# Patient Record
Sex: FEMALE | Race: WHITE | NOT HISPANIC OR LATINO | Employment: UNEMPLOYED | URBAN - NONMETROPOLITAN AREA
[De-identification: names, ages, dates, MRNs, and addresses within clinical notes are randomized per-mention and may not be internally consistent; named-entity substitution may affect disease eponyms.]

---

## 2023-03-17 PROBLEM — R76.8 POSITIVE ANA (ANTINUCLEAR ANTIBODY): Status: ACTIVE | Noted: 2023-03-17

## 2023-03-17 PROBLEM — N92.0 HEAVY PERIODS: Status: ACTIVE | Noted: 2023-03-17

## 2023-03-17 PROBLEM — F41.9 ANXIETY: Status: ACTIVE | Noted: 2023-03-17

## 2023-03-17 PROBLEM — M21.42 BILATERAL PES PLANUS: Status: ACTIVE | Noted: 2023-03-17

## 2023-03-17 PROBLEM — M27.8 JAW MASS: Status: ACTIVE | Noted: 2023-03-17

## 2023-03-17 PROBLEM — R01.1 HEART MURMUR: Status: ACTIVE | Noted: 2023-03-17

## 2023-03-17 PROBLEM — M79.605 BILATERAL LEG AND FOOT PAIN: Status: ACTIVE | Noted: 2023-03-17

## 2023-03-17 PROBLEM — M25.50 POLYARTHRALGIA: Status: ACTIVE | Noted: 2023-03-17

## 2023-03-17 PROBLEM — R20.2 NUMBNESS AND TINGLING: Status: ACTIVE | Noted: 2023-03-17

## 2023-03-17 PROBLEM — R26.2 DIFFICULTY WALKING: Status: ACTIVE | Noted: 2023-03-17

## 2023-03-17 PROBLEM — R20.0 FACIAL NUMBNESS: Status: ACTIVE | Noted: 2023-03-17

## 2023-03-17 PROBLEM — E66.3 OVERWEIGHT WITH BODY MASS INDEX (BMI) OF 27 TO 27.9 IN ADULT: Status: ACTIVE | Noted: 2023-03-17

## 2023-03-17 PROBLEM — J02.9 SORE THROAT: Status: ACTIVE | Noted: 2023-03-17

## 2023-03-17 PROBLEM — R21 SKIN RASH: Status: ACTIVE | Noted: 2023-03-17

## 2023-03-17 PROBLEM — D51.0 PERNICIOUS ANEMIA: Status: ACTIVE | Noted: 2023-03-17

## 2023-03-17 PROBLEM — R53.82 CHRONIC FATIGUE: Status: ACTIVE | Noted: 2023-03-17

## 2023-03-17 PROBLEM — M21.41 FLAT FEET, BILATERAL: Status: ACTIVE | Noted: 2023-03-17

## 2023-03-17 PROBLEM — L23.9 ALLERGIC CONTACT DERMATITIS: Status: ACTIVE | Noted: 2023-03-17

## 2023-03-17 PROBLEM — B37.31 VAGINAL CANDIDIASIS: Status: ACTIVE | Noted: 2023-03-17

## 2023-03-17 PROBLEM — J30.9 ALLERGIC RHINITIS: Status: ACTIVE | Noted: 2023-03-17

## 2023-03-17 PROBLEM — R35.0 URINARY FREQUENCY: Status: ACTIVE | Noted: 2023-03-17

## 2023-03-17 PROBLEM — E55.9 VITAMIN D DEFICIENCY: Status: ACTIVE | Noted: 2023-03-17

## 2023-03-17 PROBLEM — R59.1 LYMPHADENOPATHY: Status: ACTIVE | Noted: 2023-03-17

## 2023-03-17 PROBLEM — M79.672 BILATERAL LEG AND FOOT PAIN: Status: ACTIVE | Noted: 2023-03-17

## 2023-03-17 PROBLEM — M21.41 BILATERAL PES PLANUS: Status: ACTIVE | Noted: 2023-03-17

## 2023-03-17 PROBLEM — R22.0 CHEEK MASS: Status: ACTIVE | Noted: 2023-03-17

## 2023-03-17 PROBLEM — R20.0 NUMBNESS AND TINGLING: Status: ACTIVE | Noted: 2023-03-17

## 2023-03-17 PROBLEM — M79.604 BILATERAL LEG AND FOOT PAIN: Status: ACTIVE | Noted: 2023-03-17

## 2023-03-17 PROBLEM — M21.42 FLAT FEET, BILATERAL: Status: ACTIVE | Noted: 2023-03-17

## 2023-03-17 PROBLEM — L98.9 CHANGING SKIN LESION: Status: ACTIVE | Noted: 2023-03-17

## 2023-03-17 PROBLEM — E78.5 DYSLIPIDEMIA: Status: ACTIVE | Noted: 2023-03-17

## 2023-03-17 PROBLEM — R11.2 NAUSEA AND VOMITING IN ADULT: Status: ACTIVE | Noted: 2023-03-17

## 2023-03-17 PROBLEM — N92.6 IRREGULAR MENSES: Status: ACTIVE | Noted: 2023-03-17

## 2023-03-17 PROBLEM — M79.671 BILATERAL LEG AND FOOT PAIN: Status: ACTIVE | Noted: 2023-03-17

## 2023-03-17 RX ORDER — ERGOCALCIFEROL 1.25 MG/1
1.25 CAPSULE ORAL
COMMUNITY
End: 2023-07-26 | Stop reason: WASHOUT

## 2023-03-17 RX ORDER — ACETAMINOPHEN, DEXTROMETHORPHAN HBR, DOXYLAMINE SUCCINATE, PHENYLEPHRINE HCL 650; 20; 12.5; 1 MG/30ML; MG/30ML; MG/30ML; MG/30ML
1000 SOLUTION ORAL DAILY
COMMUNITY
End: 2023-07-26 | Stop reason: WASHOUT

## 2023-03-20 ENCOUNTER — OFFICE VISIT (OUTPATIENT)
Dept: PRIMARY CARE | Facility: CLINIC | Age: 33
End: 2023-03-20
Payer: COMMERCIAL

## 2023-03-20 DIAGNOSIS — D51.0 PERNICIOUS ANEMIA: ICD-10-CM

## 2023-03-20 PROCEDURE — 96372 THER/PROPH/DIAG INJ SC/IM: CPT | Performed by: FAMILY MEDICINE

## 2023-03-20 PROCEDURE — 99211 OFF/OP EST MAY X REQ PHY/QHP: CPT | Performed by: FAMILY MEDICINE

## 2023-03-20 RX ORDER — CYANOCOBALAMIN 1000 UG/ML
1000 INJECTION, SOLUTION INTRAMUSCULAR; SUBCUTANEOUS ONCE
Status: COMPLETED | OUTPATIENT
Start: 2023-03-20 | End: 2023-03-20

## 2023-03-20 RX ADMIN — CYANOCOBALAMIN 1000 MCG: 1000 INJECTION, SOLUTION INTRAMUSCULAR; SUBCUTANEOUS at 11:52

## 2023-03-28 LAB — CHORIOGONADOTROPIN (MIU/ML) IN SER/PLAS: 5610 IU/L

## 2023-03-30 LAB — CHORIOGONADOTROPIN (MIU/ML) IN SER/PLAS: ABNORMAL MIU/ML

## 2023-04-26 LAB
ABO GROUP (TYPE) IN BLOOD: NORMAL
ANTIBODY SCREEN: NORMAL
ERYTHROCYTE DISTRIBUTION WIDTH (RATIO) BY AUTOMATED COUNT: 12.6 % (ref 11.5–14.5)
ERYTHROCYTE MEAN CORPUSCULAR HEMOGLOBIN CONCENTRATION (G/DL) BY AUTOMATED: 33.2 G/DL (ref 32–36)
ERYTHROCYTE MEAN CORPUSCULAR VOLUME (FL) BY AUTOMATED COUNT: 91 FL (ref 80–100)
ERYTHROCYTES (10*6/UL) IN BLOOD BY AUTOMATED COUNT: 4.49 X10E12/L (ref 4–5.2)
HEMATOCRIT (%) IN BLOOD BY AUTOMATED COUNT: 40.7 % (ref 36–46)
HEMOGLOBIN (G/DL) IN BLOOD: 13.5 G/DL (ref 12–16)
HEPATITIS B VIRUS SURFACE AG PRESENCE IN SERUM: NONREACTIVE
HEPATITIS C VIRUS AB PRESENCE IN SERUM: NONREACTIVE
HIV 1/ 2 AG/AB SCREEN: NONREACTIVE
LEUKOCYTES (10*3/UL) IN BLOOD BY AUTOMATED COUNT: 9.6 X10E9/L (ref 4.4–11.3)
PLATELETS (10*3/UL) IN BLOOD AUTOMATED COUNT: 286 X10E9/L (ref 150–450)
REFLEX ADDED, ANEMIA PANEL: NORMAL
RH FACTOR: NORMAL
RUBELLA VIRUS IGG AB: POSITIVE
SYPHILIS TOTAL AB: NONREACTIVE

## 2023-04-27 LAB
CHLAMYDIA TRACH., AMPLIFIED: NEGATIVE
N. GONORRHEA, AMPLIFIED: NEGATIVE
URINE CULTURE: NORMAL

## 2023-06-15 LAB — LAB MOLECULAR CA TECHNICAL NOTES: NORMAL

## 2023-06-25 LAB — URINE CULTURE: NO GROWTH

## 2023-07-26 ENCOUNTER — OFFICE VISIT (OUTPATIENT)
Dept: PRIMARY CARE | Facility: CLINIC | Age: 33
End: 2023-07-26
Payer: COMMERCIAL

## 2023-07-26 VITALS
HEIGHT: 60 IN | BODY MASS INDEX: 30.15 KG/M2 | TEMPERATURE: 97 F | OXYGEN SATURATION: 100 % | DIASTOLIC BLOOD PRESSURE: 74 MMHG | HEART RATE: 62 BPM | SYSTOLIC BLOOD PRESSURE: 112 MMHG | WEIGHT: 153.6 LBS

## 2023-07-26 DIAGNOSIS — Z3A.22 22 WEEKS GESTATION OF PREGNANCY (HHS-HCC): Primary | ICD-10-CM

## 2023-07-26 DIAGNOSIS — M25.50 POLYARTHRALGIA: ICD-10-CM

## 2023-07-26 PROCEDURE — 1036F TOBACCO NON-USER: CPT | Performed by: NURSE PRACTITIONER

## 2023-07-26 PROCEDURE — 99214 OFFICE O/P EST MOD 30 MIN: CPT | Performed by: NURSE PRACTITIONER

## 2023-07-26 RX ORDER — BUTYROSPERMUM PARKII(SHEA BUTTER), SIMMONDSIA CHINENSIS (JOJOBA) SEED OIL, ALOE BARBADENSIS LEAF EXTRACT .01; 1; 3.5 G/100G; G/100G; G/100G
250 LIQUID TOPICAL 2 TIMES DAILY
COMMUNITY
End: 2023-10-09 | Stop reason: ALTCHOICE

## 2023-07-26 ASSESSMENT — ENCOUNTER SYMPTOMS
PALPITATIONS: 0
ENDOCRINE NEGATIVE: 1
FATIGUE: 0
HEMATOLOGIC/LYMPHATIC NEGATIVE: 1
CONSTIPATION: 0
SHORTNESS OF BREATH: 0
ALLERGIC/IMMUNOLOGIC NEGATIVE: 1
COUGH: 0
WHEEZING: 0
SORE THROAT: 0
HEADACHES: 0
SINUS PAIN: 0
ACTIVITY CHANGE: 0
PSYCHIATRIC NEGATIVE: 1
FEVER: 0
VOMITING: 0
EYES NEGATIVE: 1
MUSCULOSKELETAL NEGATIVE: 1
LIGHT-HEADEDNESS: 0
DIZZINESS: 0
ABDOMINAL PAIN: 0
CHEST TIGHTNESS: 0
ABDOMINAL DISTENTION: 0
WEAKNESS: 0
NAUSEA: 0
CHILLS: 0
SINUS PRESSURE: 0
NUMBNESS: 0
DIARRHEA: 0
RHINORRHEA: 0

## 2023-07-26 NOTE — PROGRESS NOTES
Subjective   Patient ID: Luma Krishnamurthy is a 33 y.o. female who presents for New Patient Visit (22 weeks pregnant, sees Dr. Brock).    New patient, previous patient of Dr. Dorsey  22 weeks pregnant, follows with Dr. Brock  Follows with podiatry for foot issues    Preventive:  CRC screen:  Mammogram:  DEXA scan:  PAP:  CT cardiac scoring:  LDCT lung cancer screening:    Orders Only on 06/24/2023  Urine Culture                                 Date: 06/24/2023  Value: NO GROWTH     Status: Final  ------------  Orders Only on 06/12/2023  Technical Notes                               Date: 06/12/2023  Value: SEE BELOW     Status: Final                Comment:  Genetics test results are available electronically in the   [facility] under Diagnostic Testing-> Genetics.  Results will be sent on a separate report.    ------------  Orders Only on 04/26/2023  Urine Culture                                 Date: 04/26/2023  Value: NO SIGNIFICANT GROWTH.                       Status: Final  ------------  Orders Only on 04/26/2023  Neisseria gonorrhea,Amplified                 Date: 04/26/2023  Value: NEGATIVE    Ref range: Negative           Status: Final                Comment:  The APTIMA Combo 2 assay is FDA-approved for Chlamydia    trachomatis and Neisseria gonorrhoeae testing on female    endocervical and vaginal swabs, ThinPrep liquid pap    samples, male urine samples and urethral swabs.    Performance characteristics for Chlamydia trachomatis and    Neisseria gonorrhoeae testing on specific non-FDA-approved    sample types (female urine samples) have been validated by    [facility]. This    laboratory is certified by CLIA to perform high complexity    testing. Samples from all other sites are not validated    for this method.    Chlamydia trachomatis, Amplified              Date: 04/26/2023  Value: NEGATIVE    Ref range: Negative           Status: Final                Comment:  The  APTIMA Combo 2 assay is FDA-approved for Chlamydia    trachomatis and Neisseria gonorrhoeae testing on female    endocervical and vaginal swabs, ThinPrep liquid pap    samples, male urine samples and urethral swabs.    Performance characteristics for Chlamydia trachomatis and    Neisseria gonorrhoeae testing on specific non-FDA-approved    sample types (female urine samples) have been validated by    [facility]. This    laboratory is certified by CLIA to perform high complexity    testing. Samples from all other sites are not validated    for this method.    ------------  Orders Only on 04/26/2023  ABO GROUP (TYPE) IN BLOOD                     Date: 04/26/2023  Value: O             Status: Final  Rh                                            Date: 04/26/2023  Value: POS           Status: Final  ANTIBODY SCREEN                               Date: 04/26/2023  Value: NEG           Status: Final  ------------  Orders Only on 04/26/2023  WBC                                           Date: 04/26/2023  Value: 9.6         Ref range: 4.4 - 11.3 x10E9*  Status: Final  RBC                                           Date: 04/26/2023  Value: 4.49        Ref range: 4.00 - 5.20 x10E*  Status: Final  Hemoglobin                                    Date: 04/26/2023  Value: 13.5        Ref range: 12.0 - 16.0 g/dL   Status: Final  Hematocrit                                    Date: 04/26/2023  Value: 40.7        Ref range: 36.0 - 46.0 %      Status: Final  MCV                                           Date: 04/26/2023  Value: 91          Ref range: 80 - 100 fL        Status: Final  MCHC                                          Date: 04/26/2023  Value: 33.2        Ref range: 32.0 - 36.0 g/dL   Status: Final  Platelets                                     Date: 04/26/2023  Value: 286         Ref range: 150 - 450 x10E9/L  Status: Final  RDW                                           Date: 04/26/2023  Value:  12.6        Ref range: 11.5 - 14.5 %      Status: Final  Reflex added, Anemia panel                    Date: 04/26/2023  Value: NONE          Status: Final  Syphilis Total Ab                             Date: 04/26/2023  Value: NONREACTIVE Ref range: NONREACTIVE        Status: Final                Comment: No serologic evidence of syphilis infection.  If recent exposure is suspected, repeat syphilis testing  is recommended in 2 to 4 weeks.    Rubella IgG                                   Date: 04/26/2023  Value: POSITIVE      Status: Final                Comment: INTERPRETATIVE COMMENT   NEGATIVE: No IgG antibodies specific to Rubella detected.             It is likely that the patient has not had a             previous exposure to Rubella through infection             or vaccination. Alternatively, the patient may have been             exposed to Rubella but a failure to respond may indicate             immunodeficiency.   EQUIVOCAL:Equivocal results; obtain additional sample for retesting.   POSITIVE: IgG antibody to Rubella detected. This may indicate that             the patient was exposed to Rubella through infection or             vaccination.  The interpretation of serological tests should take into account  the immunological status of the patient.  Test results for  patients, including immunocompromised patients, neonates, and  pediatric patients, reflect their capacity to respond  immunologically to the virus as well as their exposure to the  pathogen. Patients treated with IVIG may demonstrate altered  results in serological assays.                             Hepatitis B Surface Ag                        Date: 04/26/2023  Value: NONREACTIVE Ref range: NONREACTIVE        Status: Final                Comment:  Biotin interference may cause falsely decreased results.   Patients taking a Biotin dose of up to 5 mg/day should   refrain from taking Biotin for 24 hours before sample   collection. Providers may  contact their local laboratory   for further information.    Hepatitis C Ab                                Date: 04/26/2023  Value: NONREACTIVE Ref range: NONREACTIVE        Status: Final                Comment:  Results from patients taking biotin supplements or receiving   high-dose biotin therapy should be interpreted with caution   due to possible interference with this test. Providers may    contact their local laboratory for further information.    HIV 1 and 2 Screen                            Date: 04/26/2023  Value: NONREACTIVE Ref range: NONREACTIVE        Status: Final                Comment:  HIV Ag/Ab screen is performed using the [employer]   HIV Ag/Ab Combo assay which detects the presence of HIV    p24 antigen as well as antibodies to HIV-1   (Group M and O) and HIV-2.  .  No laboratory evidence of HIV infection. If acute HIV infection is   suspected, consider testing for HIV RNA by PCR (viral load).    ------------  Orders Only on 03/30/2023  hCG Quantitative                              Date: 03/30/2023  Value: 11,727 (A)  Ref range: mIU/mL             Status: Final                Comment: .  Total HCG measurement is performed using the [employer]  Immunoassay which detects intact HCG and free beta HCG subunit.   .  This test is not indicated for use as a tumor marker.  HCG testing is performed using a different test methodology at [facility] than other [facility]. Direct result comparison  should only be made within the same method.   REF VALUES  NONPREGNANT FEMALE <5  MALES              <5  .  Low-level positive HCG results can be seen in early pregnancy,   in ela- or post-menopausal females due to normal pituitary HCG  production, or with analytic interference. Repeat testing in 48-72  hours can aid in assessing for pregnancy as results should double  in this time period. FSH measurement is recommended in ela- or  post-menopausal females as concurrent  elevation of FSH can support  pituitary production as the source of the HCG elevation.    ------------  Orders Only on 03/28/2023  hCG Quantitative                              Date: 03/28/2023  Value: 5,610 (A)   Ref range: IU/L               Status: Final                Comment: .  Total HCG measurement is performed using the Siem[employer]  immunoassay which detects intact HCG and free beta HCG subunit.  .  This test is not indicated for use as a tumor marker.  HCG testing is performed using a different test methodology at [facility] than other [facility]. Direct result comparison  should only be made within the same method.  .  REF VALUES  NONPREGNANT FEMALE <5  MALES              <5  .  Low-level positive HCG results can be seen in early pregnancy,   in ela- or post-menopausal females due to normal pituitary HCG  production, or with analytic interference. Repeat testing in 48-72  hours can aid in assessing for pregnancy as results should double  in this time period. FSH measurement is recommended in ela- or  post-menopausal females as concurrent elevation of FSH can support  pituitary production as the source of the HCG elevation.         Review of Systems   Constitutional:  Negative for activity change, chills, fatigue and fever.   HENT:  Negative for congestion, rhinorrhea, sinus pressure, sinus pain and sore throat.    Eyes: Negative.    Respiratory:  Negative for cough, chest tightness, shortness of breath and wheezing.    Cardiovascular:  Negative for chest pain, palpitations and leg swelling.   Gastrointestinal:  Negative for abdominal distention, abdominal pain, constipation, diarrhea, nausea and vomiting.   Endocrine: Negative.    Genitourinary: Negative.    Musculoskeletal: Negative.    Skin: Negative.    Allergic/Immunologic: Negative.    Neurological:  Negative for dizziness, syncope, weakness, light-headedness, numbness and headaches.   Hematological: Negative.     Psychiatric/Behavioral: Negative.     All other systems reviewed and are negative.      Objective   /74   Pulse 62   Temp 36.1 °C (97 °F)   Ht 1.524 m (5')   Wt 69.7 kg (153 lb 9.6 oz)   SpO2 100%   BMI 30.00 kg/m²     Physical Exam  Vitals and nursing note reviewed.   Constitutional:       General: She is not in acute distress.     Appearance: Normal appearance. She is not ill-appearing.   HENT:      Head: Normocephalic and atraumatic.      Right Ear: Tympanic membrane, ear canal and external ear normal.      Left Ear: Tympanic membrane, ear canal and external ear normal.      Nose: Nose normal.      Mouth/Throat:      Mouth: Mucous membranes are moist.      Pharynx: Oropharynx is clear.   Eyes:      Pupils: Pupils are equal, round, and reactive to light.   Cardiovascular:      Rate and Rhythm: Normal rate and regular rhythm.      Pulses: Normal pulses.      Heart sounds: Normal heart sounds. No murmur heard.  Pulmonary:      Effort: Pulmonary effort is normal. No respiratory distress.      Breath sounds: Normal breath sounds. No wheezing.   Abdominal:      General: Bowel sounds are normal. There is no distension.      Palpations: Abdomen is soft.      Tenderness: There is no abdominal tenderness.   Musculoskeletal:         General: No tenderness. Normal range of motion.      Cervical back: Normal range of motion and neck supple.      Right lower leg: No edema.      Left lower leg: No edema.   Skin:     General: Skin is warm and dry.      Capillary Refill: Capillary refill takes less than 2 seconds.      Coloration: Skin is not jaundiced.   Neurological:      General: No focal deficit present.      Mental Status: She is alert and oriented to person, place, and time.      Motor: No weakness.   Psychiatric:         Mood and Affect: Mood normal.         Behavior: Behavior normal.         Thought Content: Thought content normal.         Judgment: Judgment normal.       Assessment/Plan     #  Pregnant  -follow with gynecology  -Taking prenatal vitamins  # Fatigue  -monitor  # Foot issues  -Follows with podiatry    Follow-up 1 year for annual physical and as needed

## 2023-07-29 PROBLEM — M79.671 BILATERAL LEG AND FOOT PAIN: Status: RESOLVED | Noted: 2023-03-17 | Resolved: 2023-07-29

## 2023-07-29 PROBLEM — M21.41 FLAT FEET, BILATERAL: Status: RESOLVED | Noted: 2023-03-17 | Resolved: 2023-07-29

## 2023-07-29 PROBLEM — L23.9 ALLERGIC CONTACT DERMATITIS: Status: RESOLVED | Noted: 2023-03-17 | Resolved: 2023-07-29

## 2023-07-29 PROBLEM — R20.0 FACIAL NUMBNESS: Status: RESOLVED | Noted: 2023-03-17 | Resolved: 2023-07-29

## 2023-07-29 PROBLEM — R11.2 NAUSEA AND VOMITING IN ADULT: Status: RESOLVED | Noted: 2023-03-17 | Resolved: 2023-07-29

## 2023-07-29 PROBLEM — M79.604 BILATERAL LEG AND FOOT PAIN: Status: RESOLVED | Noted: 2023-03-17 | Resolved: 2023-07-29

## 2023-07-29 PROBLEM — M21.42 FLAT FEET, BILATERAL: Status: RESOLVED | Noted: 2023-03-17 | Resolved: 2023-07-29

## 2023-07-29 PROBLEM — M79.672 BILATERAL LEG AND FOOT PAIN: Status: RESOLVED | Noted: 2023-03-17 | Resolved: 2023-07-29

## 2023-07-29 PROBLEM — E66.3 OVERWEIGHT WITH BODY MASS INDEX (BMI) OF 27 TO 27.9 IN ADULT: Status: RESOLVED | Noted: 2023-03-17 | Resolved: 2023-07-29

## 2023-07-29 PROBLEM — M27.8 JAW MASS: Status: RESOLVED | Noted: 2023-03-17 | Resolved: 2023-07-29

## 2023-07-29 PROBLEM — R26.2 DIFFICULTY WALKING: Status: RESOLVED | Noted: 2023-03-17 | Resolved: 2023-07-29

## 2023-07-29 PROBLEM — L98.9 CHANGING SKIN LESION: Status: RESOLVED | Noted: 2023-03-17 | Resolved: 2023-07-29

## 2023-07-29 PROBLEM — M79.605 BILATERAL LEG AND FOOT PAIN: Status: RESOLVED | Noted: 2023-03-17 | Resolved: 2023-07-29

## 2023-07-29 PROBLEM — R59.1 LYMPHADENOPATHY: Status: RESOLVED | Noted: 2023-03-17 | Resolved: 2023-07-29

## 2023-07-29 PROBLEM — R22.0 CHEEK MASS: Status: RESOLVED | Noted: 2023-03-17 | Resolved: 2023-07-29

## 2023-08-24 LAB
GLUCOSE, 1 HR SCREEN, PREG: 87 MG/DL
HEMATOCRIT (%) IN BLOOD BY AUTOMATED COUNT: 35.2 % (ref 36–46)
HEMOGLOBIN (G/DL) IN BLOOD: 11.6 G/DL (ref 12–16)

## 2023-08-31 PROBLEM — B00.9 HSV (HERPES SIMPLEX VIRUS) INFECTION: Status: ACTIVE | Noted: 2023-08-31

## 2023-08-31 PROBLEM — F32.A DEPRESSION: Status: ACTIVE | Noted: 2023-08-31

## 2023-08-31 PROBLEM — K21.9 GERD (GASTROESOPHAGEAL REFLUX DISEASE): Status: ACTIVE | Noted: 2023-08-31

## 2023-08-31 RX ORDER — IBUPROFEN 600 MG/1
1 TABLET ORAL EVERY 6 HOURS PRN
COMMUNITY
End: 2023-10-09 | Stop reason: ALTCHOICE

## 2023-08-31 RX ORDER — ERGOCALCIFEROL 1.25 MG/1
1 CAPSULE ORAL
COMMUNITY
End: 2023-10-09

## 2023-08-31 RX ORDER — LANOLIN ALCOHOL/MO/W.PET/CERES
1 CREAM (GRAM) TOPICAL DAILY
COMMUNITY
End: 2023-10-09

## 2023-08-31 RX ORDER — VALACYCLOVIR HYDROCHLORIDE 1 G/1
1000 TABLET, FILM COATED ORAL
COMMUNITY
End: 2023-10-09

## 2023-09-27 VITALS — DIASTOLIC BLOOD PRESSURE: 78 MMHG | SYSTOLIC BLOOD PRESSURE: 126 MMHG | WEIGHT: 160 LBS | BODY MASS INDEX: 31.25 KG/M2

## 2023-10-09 ENCOUNTER — ROUTINE PRENATAL (OUTPATIENT)
Dept: OBSTETRICS AND GYNECOLOGY | Facility: CLINIC | Age: 33
End: 2023-10-09
Payer: COMMERCIAL

## 2023-10-09 ENCOUNTER — PREP FOR PROCEDURE (OUTPATIENT)
Dept: OBSTETRICS AND GYNECOLOGY | Facility: CLINIC | Age: 33
End: 2023-10-09

## 2023-10-09 VITALS — BODY MASS INDEX: 32.42 KG/M2 | WEIGHT: 166 LBS | SYSTOLIC BLOOD PRESSURE: 122 MMHG | DIASTOLIC BLOOD PRESSURE: 76 MMHG

## 2023-10-09 DIAGNOSIS — Z34.83 SUPERVISION OF NORMAL INTRAUTERINE PREGNANCY IN MULTIGRAVIDA IN THIRD TRIMESTER (HHS-HCC): ICD-10-CM

## 2023-10-09 LAB
POC BLOOD, URINE: NEGATIVE
POC GLUCOSE, URINE: NEGATIVE MG/DL
POC LEUKOCYTES, URINE: NEGATIVE
POC NITRITE,URINE: NEGATIVE
POC PROTEIN, URINE: ABNORMAL MG/DL

## 2023-10-09 PROCEDURE — 81003 URINALYSIS AUTO W/O SCOPE: CPT | Performed by: OBSTETRICS & GYNECOLOGY

## 2023-10-09 PROCEDURE — 0501F PRENATAL FLOW SHEET: CPT | Performed by: OBSTETRICS & GYNECOLOGY

## 2023-10-09 RX ORDER — ONDANSETRON 4 MG/1
4 TABLET, FILM COATED ORAL EVERY 6 HOURS PRN
Status: CANCELLED | OUTPATIENT
Start: 2023-10-09

## 2023-10-09 RX ORDER — LABETALOL HYDROCHLORIDE 5 MG/ML
20 INJECTION, SOLUTION INTRAVENOUS ONCE AS NEEDED
Status: CANCELLED | OUTPATIENT
Start: 2023-10-09

## 2023-10-09 RX ORDER — MISOPROSTOL 200 UG/1
800 TABLET ORAL ONCE AS NEEDED
Status: CANCELLED | OUTPATIENT
Start: 2023-10-09

## 2023-10-09 RX ORDER — LOPERAMIDE HYDROCHLORIDE 2 MG/1
4 CAPSULE ORAL EVERY 2 HOUR PRN
Status: CANCELLED | OUTPATIENT
Start: 2023-10-09

## 2023-10-09 RX ORDER — METHYLERGONOVINE MALEATE 0.2 MG/ML
0.2 INJECTION INTRAVENOUS ONCE AS NEEDED
Status: CANCELLED | OUTPATIENT
Start: 2023-10-09

## 2023-10-09 RX ORDER — METOCLOPRAMIDE 10 MG/1
10 TABLET ORAL EVERY 6 HOURS PRN
Status: CANCELLED | OUTPATIENT
Start: 2023-10-09

## 2023-10-09 RX ORDER — OXYTOCIN 10 [USP'U]/ML
10 INJECTION, SOLUTION INTRAMUSCULAR; INTRAVENOUS ONCE AS NEEDED
Status: CANCELLED | OUTPATIENT
Start: 2023-10-09

## 2023-10-09 RX ORDER — LIDOCAINE HYDROCHLORIDE 10 MG/ML
30 INJECTION INFILTRATION; PERINEURAL ONCE AS NEEDED
Status: CANCELLED | OUTPATIENT
Start: 2023-10-09

## 2023-10-09 RX ORDER — METOCLOPRAMIDE HYDROCHLORIDE 5 MG/ML
10 INJECTION INTRAMUSCULAR; INTRAVENOUS EVERY 6 HOURS PRN
Status: CANCELLED | OUTPATIENT
Start: 2023-10-09

## 2023-10-09 RX ORDER — HYDRALAZINE HYDROCHLORIDE 20 MG/ML
5 INJECTION INTRAMUSCULAR; INTRAVENOUS ONCE AS NEEDED
Status: CANCELLED | OUTPATIENT
Start: 2023-10-09

## 2023-10-09 RX ORDER — TERBUTALINE SULFATE 1 MG/ML
0.25 INJECTION SUBCUTANEOUS ONCE AS NEEDED
Status: CANCELLED | OUTPATIENT
Start: 2023-10-09

## 2023-10-09 RX ORDER — ONDANSETRON HYDROCHLORIDE 2 MG/ML
4 INJECTION, SOLUTION INTRAVENOUS EVERY 6 HOURS PRN
Status: CANCELLED | OUTPATIENT
Start: 2023-10-09

## 2023-10-09 RX ORDER — CARBOPROST TROMETHAMINE 250 UG/ML
250 INJECTION, SOLUTION INTRAMUSCULAR ONCE AS NEEDED
Status: CANCELLED | OUTPATIENT
Start: 2023-10-09

## 2023-10-09 RX ORDER — SODIUM CHLORIDE, SODIUM LACTATE, POTASSIUM CHLORIDE, CALCIUM CHLORIDE 600; 310; 30; 20 MG/100ML; MG/100ML; MG/100ML; MG/100ML
125 INJECTION, SOLUTION INTRAVENOUS CONTINUOUS
Status: CANCELLED | OUTPATIENT
Start: 2023-10-09

## 2023-10-09 RX ORDER — TRANEXAMIC ACID 100 MG/ML
1000 INJECTION, SOLUTION INTRAVENOUS ONCE AS NEEDED
Status: CANCELLED | OUTPATIENT
Start: 2023-10-09

## 2023-10-09 RX ORDER — NIFEDIPINE 10 MG/1
10 CAPSULE ORAL ONCE AS NEEDED
Status: CANCELLED | OUTPATIENT
Start: 2023-10-09

## 2023-10-09 RX ORDER — MISOPROSTOL 100 UG/1
25 TABLET ORAL
Status: SHIPPED | OUTPATIENT
Start: 2023-10-09 | End: 2023-10-10

## 2023-10-09 NOTE — PROGRESS NOTES
"Subjective   Patient ID [id number]   Luma Krishnamurthy is a 33 y.o.  at 33w2d with a working estimated date of delivery of 2023, by Ultrasound who presents for a routine prenatal visit. She denies vaginal bleeding, leakage of fluid, decreased fetal movements, or contractions.    Her pregnancy is complicated by:  Uncomplicated    Objective   Physical Exam:   Weight: 75.3 kg (166 lb)  Expected Total Weight Gain: 7 kg (15 lb)-11.5 kg (25 lb)   Pregravid BMI: 26.76  BP: 122/76  Fetal Heart Rate: 137 Fundal Height (cm): 33 cm Presentation: Vertex           Prenatal Labs  Urine Dip:  Lab Results   Component Value Date    KETONESU NEGATIVE 2022     Lab Results   Component Value Date    HGB 11.6 (L) 2023    HCT 35.2 (L) 2023    HEPBSAG NONREACTIVE 2023     No results found for: \"PAPPA\", \"AFP\", \"HCG\", \"ESTRIOL\", \"INHBA\"  No results found for: \"GLUF\", \"GLUT1\", \"XAOVNBY8CV\", \"[id number]\"    Imaging  The most recent ultrasound was performed on   with a study GA of   and EFW of  .          Assessment/Plan   Routine OB visit at 33 weeks.  Follow-up 2 weeks.  Start Valtrex at 35 weeks.  Continue prenatal vitamin.  Labs reviewed.  GBS taken.  Expected mode of delivery vaginal  Follow up in2 week for a routine prenatal visit.  "

## 2023-10-23 ENCOUNTER — ROUTINE PRENATAL (OUTPATIENT)
Dept: OBSTETRICS AND GYNECOLOGY | Facility: CLINIC | Age: 33
End: 2023-10-23
Payer: COMMERCIAL

## 2023-10-23 VITALS — WEIGHT: 167 LBS | SYSTOLIC BLOOD PRESSURE: 112 MMHG | BODY MASS INDEX: 32.61 KG/M2 | DIASTOLIC BLOOD PRESSURE: 68 MMHG

## 2023-10-23 DIAGNOSIS — Z3A.35 35 WEEKS GESTATION OF PREGNANCY (HHS-HCC): ICD-10-CM

## 2023-10-23 DIAGNOSIS — Z86.19: Primary | ICD-10-CM

## 2023-10-23 LAB
POC BLOOD, URINE: NEGATIVE
POC GLUCOSE, URINE: NEGATIVE MG/DL
POC LEUKOCYTES, URINE: NEGATIVE
POC NITRITE,URINE: NEGATIVE
POC PROTEIN, URINE: NEGATIVE MG/DL

## 2023-10-23 PROCEDURE — 81002 URINALYSIS NONAUTO W/O SCOPE: CPT | Performed by: OBSTETRICS & GYNECOLOGY

## 2023-10-23 PROCEDURE — 0501F PRENATAL FLOW SHEET: CPT | Performed by: OBSTETRICS & GYNECOLOGY

## 2023-10-23 RX ORDER — VALACYCLOVIR HYDROCHLORIDE 500 MG/1
500 TABLET, FILM COATED ORAL DAILY
Qty: 30 TABLET | Refills: 1 | Status: SHIPPED | OUTPATIENT
Start: 2023-10-23 | End: 2023-12-01 | Stop reason: HOSPADM

## 2023-11-06 ENCOUNTER — PREP FOR PROCEDURE (OUTPATIENT)
Dept: OBSTETRICS AND GYNECOLOGY | Facility: HOSPITAL | Age: 33
End: 2023-11-06

## 2023-11-06 ENCOUNTER — ROUTINE PRENATAL (OUTPATIENT)
Dept: OBSTETRICS AND GYNECOLOGY | Facility: CLINIC | Age: 33
End: 2023-11-06
Payer: COMMERCIAL

## 2023-11-06 VITALS — SYSTOLIC BLOOD PRESSURE: 122 MMHG | DIASTOLIC BLOOD PRESSURE: 78 MMHG | BODY MASS INDEX: 33.01 KG/M2 | WEIGHT: 169 LBS

## 2023-11-06 DIAGNOSIS — O48.0 POST-TERM PREGNANCY, 40-42 WEEKS OF GESTATION (HHS-HCC): ICD-10-CM

## 2023-11-06 DIAGNOSIS — O99.210 OBESITY IN PREGNANCY (HHS-HCC): Primary | ICD-10-CM

## 2023-11-06 DIAGNOSIS — Z34.83 SUPERVISION OF NORMAL INTRAUTERINE PREGNANCY IN MULTIGRAVIDA, THIRD TRIMESTER (HHS-HCC): ICD-10-CM

## 2023-11-06 PROCEDURE — 87081 CULTURE SCREEN ONLY: CPT

## 2023-11-06 PROCEDURE — 81003 URINALYSIS AUTO W/O SCOPE: CPT | Performed by: OBSTETRICS & GYNECOLOGY

## 2023-11-06 PROCEDURE — 0501F PRENATAL FLOW SHEET: CPT | Performed by: OBSTETRICS & GYNECOLOGY

## 2023-11-06 PROCEDURE — 59025 FETAL NON-STRESS TEST: CPT | Performed by: OBSTETRICS & GYNECOLOGY

## 2023-11-06 NOTE — PROGRESS NOTES
"Subjective   Patient ID [id number]   Luma Krishnamurthy is a 33 y.o.  at 37w2d with a working estimated date of delivery of 2023, by Ultrasound who presents for a routine prenatal visit. She denies vaginal bleeding, leakage of fluid, decreased fetal movements, or contractions.    Her pregnancy is complicated by:  History HSV.  On Valtrex.  BMI greater than 30.  Nonstress test weekly.  NST reactive.  GBS obtained    Objective   Physical Exam:   Weight: 76.7 kg (169 lb)  Expected Total Weight Gain: 7 kg (15 lb)-11.5 kg (25 lb)   Pregravid BMI: 26.76  BP: 122/78  Fetal Heart Rate: Nonstress test reactive Fundal Height (cm): 37 cm Presentation: Vertex           Prenatal Labs  Urine Dip:  Lab Results   Component Value Date    KETONESU NEGATIVE 2022     Lab Results   Component Value Date    HGB 11.6 (L) 2023    HCT 35.2 (L) 2023    ABO O 2023    HEPBSAG NONREACTIVE 2023     No results found for: \"PAPPA\", \"AFP\", \"HCG\", \"ESTRIOL\", \"INHBA\"  No results found for: \"GLUF\", \"GLUT1\", \"VWEBHOG5WB\", \"[id number]\"    Imaging  The most recent ultrasound was performed on   with a study GA of   and EFW of  .          Assessment/Plan   Weekly NST.  Continue Valtrex.  Signs and symptoms of labor.  Importance of daily fetal movement  Continue prenatal vitamin.  Labs reviewed.  GBS taken.  Expected mode of delivery vaginal  Follow up in 1 week for a routine prenatal visit.  "

## 2023-11-10 LAB — GP B STREP GENITAL QL CULT: ABNORMAL

## 2023-11-13 ENCOUNTER — PROCEDURE VISIT (OUTPATIENT)
Dept: OBSTETRICS AND GYNECOLOGY | Facility: CLINIC | Age: 33
End: 2023-11-13
Payer: COMMERCIAL

## 2023-11-13 VITALS — SYSTOLIC BLOOD PRESSURE: 112 MMHG | DIASTOLIC BLOOD PRESSURE: 74 MMHG | BODY MASS INDEX: 33.2 KG/M2 | WEIGHT: 170 LBS

## 2023-11-13 DIAGNOSIS — Z34.83 NORMAL PREGNANCY IN MULTIGRAVIDA IN THIRD TRIMESTER (HHS-HCC): ICD-10-CM

## 2023-11-13 DIAGNOSIS — O99.210 OBESITY IN PREGNANCY (HHS-HCC): Primary | ICD-10-CM

## 2023-11-13 PROCEDURE — 0501F PRENATAL FLOW SHEET: CPT | Performed by: OBSTETRICS & GYNECOLOGY

## 2023-11-13 PROCEDURE — 81003 URINALYSIS AUTO W/O SCOPE: CPT | Performed by: OBSTETRICS & GYNECOLOGY

## 2023-11-13 PROCEDURE — 87081 CULTURE SCREEN ONLY: CPT

## 2023-11-13 PROCEDURE — 59025 FETAL NON-STRESS TEST: CPT | Performed by: OBSTETRICS & GYNECOLOGY

## 2023-11-13 NOTE — PROGRESS NOTES
"Subjective   Patient ID [id number]   Luma Krishnamurthy is a 33 y.o.  at 38w2d with a working estimated date of delivery of 2023, by Ultrasound who presents for a routine prenatal visit. She denies vaginal bleeding, leakage of fluid, decreased fetal movements, or contractions.    Her pregnancy is complicated by:  Obesity in pregnancy.  Nonstress test reactive.  Weekly NST    Taking HSV prophylaxis with daily Valtrex    Repeat GBS, as no normal swathi identified on previous culture    Objective   Physical Exam:      Expected Total Weight Gain: 7 kg (15 lb)-11.5 kg (25 lb)   Pregravid BMI: 26.76                     Prenatal Labs  Urine Dip:  Lab Results   Component Value Date    KETONESU NEGATIVE 2022     Lab Results   Component Value Date    HGB 11.6 (L) 2023    HCT 35.2 (L) 2023    ABO O 2023    HEPBSAG NONREACTIVE 2023     No results found for: \"PAPPA\", \"AFP\", \"HCG\", \"ESTRIOL\", \"INHBA\"  No results found for: \"GLUF\", \"GLUT1\", \"AYJULCU0XC\", \"[id number]\"    Imaging  The most recent ultrasound was performed on   with a study GA of   and EFW of  .          Assessment/Plan   Weekly NST.  Importance of daily fetal movement.  Signs and symptoms of labor  IOL   Continue prenatal vitamin.  Labs reviewed.  GBS taken.  Expected mode of delivery vaginal  Follow up in 1 week for a routine prenatal visit.  "

## 2023-11-16 LAB — GP B STREP GENITAL QL CULT: NORMAL

## 2023-11-22 ENCOUNTER — ROUTINE PRENATAL (OUTPATIENT)
Dept: OBSTETRICS AND GYNECOLOGY | Facility: CLINIC | Age: 33
End: 2023-11-22
Payer: COMMERCIAL

## 2023-11-22 VITALS — WEIGHT: 170 LBS | SYSTOLIC BLOOD PRESSURE: 104 MMHG | DIASTOLIC BLOOD PRESSURE: 72 MMHG | BODY MASS INDEX: 33.2 KG/M2

## 2023-11-22 DIAGNOSIS — Z3A.39 39 WEEKS GESTATION OF PREGNANCY (HHS-HCC): ICD-10-CM

## 2023-11-22 DIAGNOSIS — O99.210 OBESITY IN PREGNANCY (HHS-HCC): ICD-10-CM

## 2023-11-22 PROCEDURE — 0501F PRENATAL FLOW SHEET: CPT | Performed by: OBSTETRICS & GYNECOLOGY

## 2023-11-22 PROCEDURE — 81002 URINALYSIS NONAUTO W/O SCOPE: CPT | Performed by: OBSTETRICS & GYNECOLOGY

## 2023-11-22 PROCEDURE — 59025 FETAL NON-STRESS TEST: CPT | Performed by: OBSTETRICS & GYNECOLOGY

## 2023-11-22 NOTE — PROGRESS NOTES
"Subjective   Patient ID [id number]   Luma Krishnamurthy is a 33 y.o.  at 39w4d with a working estimated date of delivery of 2023, by Ultrasound who presents for a routine prenatal visit. She denies vaginal bleeding, leakage of fluid, decreased fetal movements, or contractions.    Her pregnancy is complicated by:  Obesity in pregnancy.  NST reactive.  Feels like she has a yeast infection.  May use Monistat over-the-counter.  No evidence of HSV recurrence reviewed symptoms to watch for.  Scheduled for induction this     Objective   Physical Exam:   Weight: 77.1 kg (170 lb)  Expected Total Weight Gain: 7 kg (15 lb)-11.5 kg (25 lb)   Pregravid BMI: 26.76  BP: 104/72                  Prenatal Labs  Urine Dip:  Lab Results   Component Value Date    KETONESU NEGATIVE 2022     Lab Results   Component Value Date    HGB 11.6 (L) 2023    HCT 35.2 (L) 2023    ABO O 2023    HEPBSAG NONREACTIVE 2023     No results found for: \"PAPPA\", \"AFP\", \"HCG\", \"ESTRIOL\", \"INHBA\"  No results found for: \"GLUF\", \"GLUT1\", \"[id number]\", \"[id number]\"    Imaging  The most recent ultrasound was performed on   with a study GA of   and EFW of  .          Assessment/Plan   Monistat 3 over-the-counter.  NST reactive  Continue prenatal vitamin.  Labs reviewed.  GBS taken.  Expected mode of delivery vaginal.  Plan will be oral Cytotec followed by Pitocin.  Reviewed induction of labor.  Risks of fetal heart rate changes, tachysystole, need for emergent or operative delivery  Follow up in 1 week for a routine prenatal visit.  "

## 2023-11-26 ENCOUNTER — APPOINTMENT (OUTPATIENT)
Dept: OBSTETRICS AND GYNECOLOGY | Facility: HOSPITAL | Age: 33
End: 2023-11-26
Payer: COMMERCIAL

## 2023-11-27 ENCOUNTER — ROUTINE PRENATAL (OUTPATIENT)
Dept: OBSTETRICS AND GYNECOLOGY | Facility: CLINIC | Age: 33
End: 2023-11-27
Payer: COMMERCIAL

## 2023-11-27 VITALS — DIASTOLIC BLOOD PRESSURE: 74 MMHG | WEIGHT: 172 LBS | SYSTOLIC BLOOD PRESSURE: 112 MMHG | BODY MASS INDEX: 33.59 KG/M2

## 2023-11-27 DIAGNOSIS — Z34.83 SUPERVISION OF NORMAL INTRAUTERINE PREGNANCY IN MULTIGRAVIDA, THIRD TRIMESTER (HHS-HCC): ICD-10-CM

## 2023-11-27 DIAGNOSIS — Z36.89 NST (NON-STRESS TEST) REACTIVE (HHS-HCC): Primary | ICD-10-CM

## 2023-11-27 PROCEDURE — 59025 FETAL NON-STRESS TEST: CPT | Performed by: MIDWIFE

## 2023-11-27 PROCEDURE — 81003 URINALYSIS AUTO W/O SCOPE: CPT | Performed by: MIDWIFE

## 2023-11-27 PROCEDURE — 0501F PRENATAL FLOW SHEET: CPT | Performed by: MIDWIFE

## 2023-11-27 NOTE — PROGRESS NOTES
"Subjective   Patient ID [id number]   Luma Krishnamurthy is a 33 y.o.  at 40w2d with a working estimated date of delivery of 2023, by Ultrasound who presents for a routine prenatal visit. She denies vaginal bleeding, leakage of fluid, decreased fetal movements, or contractions.    Her pregnancy is complicated by:  HSV on prophylaxis    Objective   Physical Exam:   Weight: 78 kg (172 lb)  Expected Total Weight Gain: 7 kg (15 lb)-11.5 kg (25 lb)   Pregravid BMI: 26.76  BP: 112/74  Fetal Heart Rate: nst Fundal Height (cm): 39 cm Presentation: Vertex           Prenatal Labs  Urine Dip:  Lab Results   Component Value Date    KETONESU NEGATIVE 2022     Lab Results   Component Value Date    HGB 12.1 2023    HCT 36.9 2023    ABO O 2023    HEPBSAG NONREACTIVE 2023     No results found for: \"PAPPA\", \"AFP\", \"HCG\", \"ESTRIOL\", \"INHBA\"  No results found for: \"GLUF\", \"GLUT1\", \"RPBVOWF3EQ\", \"[id number]\"    Imaging  The most recent ultrasound was performed on   with a study GA of   and EFW of  .          Assessment/Plan   Diagnoses and all orders for this visit:  NST (non-stress test) reactive  Supervision of normal intrauterine pregnancy in multigravida, third trimester  -     Fetal nonstress test  -     POCT UA Automated manually resulted    Continue prenatal vitamin.  Labs reviewed.  GBS taken.  Expected mode of delivery vaginal  IOL scheduled for  at 8am  Importance of daily Fetal Movement awareness, labor and warning s/sx reviewed    "

## 2023-11-27 NOTE — PROCEDURES
Luma SYLVESTER Yessy, a  at 40w2d with an ANGELICA of 2023, by Ultrasound, was seen at [facility] for a nonstress test.    Non-Stress Test   Baseline Fetal Heart Rate for Non-Stress Test: 135 BPM  Variability in Waveform for Non-Stress Test: Moderate  Accelerations in Non-Stress Test: Yes  Decelerations in Non-Stress Test: None  Interpretation of Non-Stress Test   Interpretation of Non-Stress Test: Reactive

## 2023-11-28 ENCOUNTER — PREP FOR PROCEDURE (OUTPATIENT)
Dept: OBSTETRICS AND GYNECOLOGY | Facility: HOSPITAL | Age: 33
End: 2023-11-28

## 2023-11-28 ENCOUNTER — APPOINTMENT (OUTPATIENT)
Dept: OBSTETRICS AND GYNECOLOGY | Facility: CLINIC | Age: 33
End: 2023-11-28
Payer: COMMERCIAL

## 2023-11-29 ENCOUNTER — HOSPITAL ENCOUNTER (INPATIENT)
Facility: HOSPITAL | Age: 33
LOS: 2 days | Discharge: HOME | End: 2023-12-01
Attending: OBSTETRICS & GYNECOLOGY | Admitting: OBSTETRICS & GYNECOLOGY
Payer: COMMERCIAL

## 2023-11-29 ENCOUNTER — APPOINTMENT (OUTPATIENT)
Dept: OBSTETRICS AND GYNECOLOGY | Facility: HOSPITAL | Age: 33
End: 2023-11-29
Payer: COMMERCIAL

## 2023-11-29 DIAGNOSIS — O48.0 POST-TERM PREGNANCY, 40-42 WEEKS OF GESTATION (HHS-HCC): ICD-10-CM

## 2023-11-29 DIAGNOSIS — O99.210 OBESITY IN PREGNANCY (HHS-HCC): ICD-10-CM

## 2023-11-29 PROBLEM — Z34.90 TERM PREGNANCY (HHS-HCC): Status: ACTIVE | Noted: 2023-11-29

## 2023-11-29 LAB
ABO GROUP (TYPE) IN BLOOD: NORMAL
ANTIBODY SCREEN: NORMAL
ERYTHROCYTE [DISTWIDTH] IN BLOOD BY AUTOMATED COUNT: 14.8 % (ref 11.5–14.5)
HCT VFR BLD AUTO: 36.9 % (ref 36–46)
HGB BLD-MCNC: 12.1 G/DL (ref 12–16)
MCH RBC QN AUTO: 28.6 PG (ref 26–34)
MCHC RBC AUTO-ENTMCNC: 32.8 G/DL (ref 32–36)
MCV RBC AUTO: 87 FL (ref 80–100)
NRBC BLD-RTO: 0 /100 WBCS (ref 0–0)
PLATELET # BLD AUTO: 207 X10*3/UL (ref 150–450)
RBC # BLD AUTO: 4.23 X10*6/UL (ref 4–5.2)
RH FACTOR (ANTIGEN D): NORMAL
WBC # BLD AUTO: 7.2 X10*3/UL (ref 4.4–11.3)

## 2023-11-29 PROCEDURE — 59050 FETAL MONITOR W/REPORT: CPT

## 2023-11-29 PROCEDURE — 2500000004 HC RX 250 GENERAL PHARMACY W/ HCPCS (ALT 636 FOR OP/ED): Performed by: OBSTETRICS & GYNECOLOGY

## 2023-11-29 PROCEDURE — 36415 COLL VENOUS BLD VENIPUNCTURE: CPT | Performed by: OBSTETRICS & GYNECOLOGY

## 2023-11-29 PROCEDURE — 1120000001 HC OB PRIVATE ROOM DAILY

## 2023-11-29 PROCEDURE — 85027 COMPLETE CBC AUTOMATED: CPT | Performed by: OBSTETRICS & GYNECOLOGY

## 2023-11-29 PROCEDURE — 3E0DXGC INTRODUCTION OF OTHER THERAPEUTIC SUBSTANCE INTO MOUTH AND PHARYNX, EXTERNAL APPROACH: ICD-10-PCS | Performed by: OBSTETRICS & GYNECOLOGY

## 2023-11-29 PROCEDURE — 2500000001 HC RX 250 WO HCPCS SELF ADMINISTERED DRUGS (ALT 637 FOR MEDICARE OP): Performed by: OBSTETRICS & GYNECOLOGY

## 2023-11-29 PROCEDURE — 2500000001 HC RX 250 WO HCPCS SELF ADMINISTERED DRUGS (ALT 637 FOR MEDICARE OP)

## 2023-11-29 PROCEDURE — 3E033VJ INTRODUCTION OF OTHER HORMONE INTO PERIPHERAL VEIN, PERCUTANEOUS APPROACH: ICD-10-PCS | Performed by: OBSTETRICS & GYNECOLOGY

## 2023-11-29 PROCEDURE — 2720000007 HC OR 272 NO HCPCS

## 2023-11-29 PROCEDURE — 86900 BLOOD TYPING SEROLOGIC ABO: CPT | Performed by: OBSTETRICS & GYNECOLOGY

## 2023-11-29 RX ORDER — SODIUM CHLORIDE, SODIUM LACTATE, POTASSIUM CHLORIDE, CALCIUM CHLORIDE 600; 310; 30; 20 MG/100ML; MG/100ML; MG/100ML; MG/100ML
125 INJECTION, SOLUTION INTRAVENOUS CONTINUOUS
Status: DISCONTINUED | OUTPATIENT
Start: 2023-11-29 | End: 2023-11-30

## 2023-11-29 RX ORDER — OXYTOCIN/0.9 % SODIUM CHLORIDE 30/500 ML
60 PLASTIC BAG, INJECTION (ML) INTRAVENOUS
Status: DISCONTINUED | OUTPATIENT
Start: 2023-11-29 | End: 2023-11-30

## 2023-11-29 RX ORDER — NIFEDIPINE 10 MG/1
10 CAPSULE ORAL ONCE AS NEEDED
Status: DISCONTINUED | OUTPATIENT
Start: 2023-11-29 | End: 2023-11-30

## 2023-11-29 RX ORDER — HYDRALAZINE HYDROCHLORIDE 20 MG/ML
5 INJECTION INTRAMUSCULAR; INTRAVENOUS ONCE AS NEEDED
Status: DISCONTINUED | OUTPATIENT
Start: 2023-11-29 | End: 2023-11-30

## 2023-11-29 RX ORDER — MISOPROSTOL 200 UG/1
800 TABLET ORAL ONCE AS NEEDED
Status: DISCONTINUED | OUTPATIENT
Start: 2023-11-29 | End: 2023-11-30

## 2023-11-29 RX ORDER — METOCLOPRAMIDE 10 MG/1
10 TABLET ORAL EVERY 6 HOURS PRN
Status: DISCONTINUED | OUTPATIENT
Start: 2023-11-29 | End: 2023-11-30

## 2023-11-29 RX ORDER — METOCLOPRAMIDE HYDROCHLORIDE 5 MG/ML
10 INJECTION INTRAMUSCULAR; INTRAVENOUS EVERY 6 HOURS PRN
Status: DISCONTINUED | OUTPATIENT
Start: 2023-11-29 | End: 2023-11-30

## 2023-11-29 RX ORDER — ONDANSETRON 4 MG/1
4 TABLET, FILM COATED ORAL EVERY 6 HOURS PRN
Status: DISCONTINUED | OUTPATIENT
Start: 2023-11-29 | End: 2023-11-30

## 2023-11-29 RX ORDER — OXYTOCIN/0.9 % SODIUM CHLORIDE 30/500 ML
2-30 PLASTIC BAG, INJECTION (ML) INTRAVENOUS CONTINUOUS
Status: DISCONTINUED | OUTPATIENT
Start: 2023-11-29 | End: 2023-11-30

## 2023-11-29 RX ORDER — OXYTOCIN 10 [USP'U]/ML
10 INJECTION, SOLUTION INTRAMUSCULAR; INTRAVENOUS ONCE AS NEEDED
Status: DISCONTINUED | OUTPATIENT
Start: 2023-11-29 | End: 2023-11-30

## 2023-11-29 RX ORDER — LABETALOL HYDROCHLORIDE 5 MG/ML
20 INJECTION, SOLUTION INTRAVENOUS ONCE AS NEEDED
Status: DISCONTINUED | OUTPATIENT
Start: 2023-11-29 | End: 2023-11-30

## 2023-11-29 RX ORDER — LIDOCAINE HYDROCHLORIDE 10 MG/ML
30 INJECTION INFILTRATION; PERINEURAL ONCE AS NEEDED
Status: DISCONTINUED | OUTPATIENT
Start: 2023-11-29 | End: 2023-11-30

## 2023-11-29 RX ORDER — CARBOPROST TROMETHAMINE 250 UG/ML
250 INJECTION, SOLUTION INTRAMUSCULAR ONCE AS NEEDED
Status: DISCONTINUED | OUTPATIENT
Start: 2023-11-29 | End: 2023-11-30

## 2023-11-29 RX ORDER — METHYLERGONOVINE MALEATE 0.2 MG/ML
0.2 INJECTION INTRAVENOUS ONCE AS NEEDED
Status: DISCONTINUED | OUTPATIENT
Start: 2023-11-29 | End: 2023-11-30

## 2023-11-29 RX ORDER — ONDANSETRON HYDROCHLORIDE 2 MG/ML
4 INJECTION, SOLUTION INTRAVENOUS EVERY 6 HOURS PRN
Status: DISCONTINUED | OUTPATIENT
Start: 2023-11-29 | End: 2023-11-30

## 2023-11-29 RX ORDER — TRANEXAMIC ACID 100 MG/ML
1000 INJECTION, SOLUTION INTRAVENOUS ONCE AS NEEDED
Status: DISCONTINUED | OUTPATIENT
Start: 2023-11-29 | End: 2023-11-30

## 2023-11-29 RX ORDER — LOPERAMIDE HYDROCHLORIDE 2 MG/1
4 CAPSULE ORAL EVERY 2 HOUR PRN
Status: DISCONTINUED | OUTPATIENT
Start: 2023-11-29 | End: 2023-11-30

## 2023-11-29 RX ORDER — TERBUTALINE SULFATE 1 MG/ML
0.25 INJECTION SUBCUTANEOUS ONCE AS NEEDED
Status: DISCONTINUED | OUTPATIENT
Start: 2023-11-29 | End: 2023-11-30

## 2023-11-29 RX ADMIN — MISOPROSTOL 25 MCG: 100 TABLET ORAL at 19:37

## 2023-11-29 RX ADMIN — MISOPROSTOL 25 MCG: 100 TABLET ORAL at 17:35

## 2023-11-29 RX ADMIN — MISOPROSTOL 25 MCG: 100 TABLET ORAL at 09:27

## 2023-11-29 RX ADMIN — SODIUM CHLORIDE, POTASSIUM CHLORIDE, SODIUM LACTATE AND CALCIUM CHLORIDE 125 ML/HR: 600; 310; 30; 20 INJECTION, SOLUTION INTRAVENOUS at 08:55

## 2023-11-29 RX ADMIN — MISOPROSTOL 25 MCG: 100 TABLET ORAL at 15:37

## 2023-11-29 RX ADMIN — SODIUM CHLORIDE, POTASSIUM CHLORIDE, SODIUM LACTATE AND CALCIUM CHLORIDE 125 ML/HR: 600; 310; 30; 20 INJECTION, SOLUTION INTRAVENOUS at 23:51

## 2023-11-29 RX ADMIN — MISOPROSTOL 25 MCG: 100 TABLET ORAL at 13:34

## 2023-11-29 RX ADMIN — Medication 2 MILLI-UNITS/MIN: at 23:39

## 2023-11-29 RX ADMIN — MISOPROSTOL 25 MCG: 100 TABLET ORAL at 11:40

## 2023-11-29 SDOH — SOCIAL STABILITY: SOCIAL INSECURITY: ARE THERE ANY APPARENT SIGNS OF INJURIES/BEHAVIORS THAT COULD BE RELATED TO ABUSE/NEGLECT?: NO

## 2023-11-29 SDOH — SOCIAL STABILITY: SOCIAL INSECURITY: DOES ANYONE TRY TO KEEP YOU FROM HAVING/CONTACTING OTHER FRIENDS OR DOING THINGS OUTSIDE YOUR HOME?: NO

## 2023-11-29 SDOH — HEALTH STABILITY: MENTAL HEALTH: WISH TO BE DEAD (PAST 1 MONTH): NO

## 2023-11-29 SDOH — SOCIAL STABILITY: SOCIAL INSECURITY: HAS ANYONE EVER THREATENED TO HURT YOUR FAMILY OR YOUR PETS?: NO

## 2023-11-29 SDOH — SOCIAL STABILITY: SOCIAL INSECURITY: ABUSE SCREEN: ADULT

## 2023-11-29 SDOH — SOCIAL STABILITY: SOCIAL INSECURITY: VERBAL ABUSE: DENIES

## 2023-11-29 SDOH — SOCIAL STABILITY: SOCIAL INSECURITY: PHYSICAL ABUSE: DENIES

## 2023-11-29 SDOH — HEALTH STABILITY: MENTAL HEALTH: SUICIDAL BEHAVIOR (LIFETIME): NO

## 2023-11-29 SDOH — SOCIAL STABILITY: SOCIAL INSECURITY: DO YOU FEEL ANYONE HAS EXPLOITED OR TAKEN ADVANTAGE OF YOU FINANCIALLY OR OF YOUR PERSONAL PROPERTY?: NO

## 2023-11-29 SDOH — ECONOMIC STABILITY: HOUSING INSECURITY: DO YOU FEEL UNSAFE GOING BACK TO THE PLACE WHERE YOU ARE LIVING?: NO

## 2023-11-29 SDOH — SOCIAL STABILITY: SOCIAL INSECURITY: HAVE YOU HAD THOUGHTS OF HARMING ANYONE ELSE?: NO

## 2023-11-29 SDOH — HEALTH STABILITY: MENTAL HEALTH: NON-SPECIFIC ACTIVE SUICIDAL THOUGHTS (PAST 1 MONTH): NO

## 2023-11-29 SDOH — SOCIAL STABILITY: SOCIAL INSECURITY: ARE YOU OR HAVE YOU BEEN THREATENED OR ABUSED PHYSICALLY, EMOTIONALLY, OR SEXUALLY BY ANYONE?: NO

## 2023-11-29 SDOH — HEALTH STABILITY: MENTAL HEALTH: HAVE YOU USED ANY PRESCRIPTION DRUGS OTHER THAN PRESCRIBED IN THE PAST 12 MONTHS?: NO

## 2023-11-29 SDOH — HEALTH STABILITY: MENTAL HEALTH: WERE YOU ABLE TO COMPLETE ALL THE BEHAVIORAL HEALTH SCREENINGS?: YES

## 2023-11-29 SDOH — HEALTH STABILITY: MENTAL HEALTH: HAVE YOU USED ANY SUBSTANCES (CANABIS, COCAINE, HEROIN, HALLUCINOGENS, INHALANTS, ETC.) IN THE PAST 12 MONTHS?: NO

## 2023-11-29 ASSESSMENT — PAIN SCALES - GENERAL
PAINLEVEL_OUTOF10: 0 - NO PAIN

## 2023-11-29 ASSESSMENT — ACTIVITIES OF DAILY LIVING (ADL): LACK_OF_TRANSPORTATION: NO

## 2023-11-29 ASSESSMENT — PATIENT HEALTH QUESTIONNAIRE - PHQ9
2. FEELING DOWN, DEPRESSED OR HOPELESS: NOT AT ALL
1. LITTLE INTEREST OR PLEASURE IN DOING THINGS: NOT AT ALL
SUM OF ALL RESPONSES TO PHQ9 QUESTIONS 1 & 2: 0

## 2023-11-29 ASSESSMENT — LIFESTYLE VARIABLES
HOW OFTEN DO YOU HAVE 6 OR MORE DRINKS ON ONE OCCASION: NEVER
SKIP TO QUESTIONS 9-10: 1
HOW MANY STANDARD DRINKS CONTAINING ALCOHOL DO YOU HAVE ON A TYPICAL DAY: PATIENT DOES NOT DRINK
HOW OFTEN DO YOU HAVE A DRINK CONTAINING ALCOHOL: NEVER
AUDIT-C TOTAL SCORE: 0
AUDIT-C TOTAL SCORE: 0

## 2023-11-29 NOTE — PROGRESS NOTES
Intrapartum Progress Note    Assessment/Plan   Luma Krishnamurthy is a 33 y.o.  at 40w4d. ANGELICA: 2023, by Ultrasound.     Received 4th dose cystotec  Irreg CTNs  Category 1 tracing      Principal Problem:    Term pregnancy    Pregnancy Problems (from 23 to present)       Problem Noted Resolved    Term pregnancy 2023 by Lyle Brock MD No    Priority:  Medium              Subjective   Continue w PO cytotec for 6 doses, then pitocin    Objective   Last Vitals:  Temp Pulse Resp BP MAP Pulse Ox   37.1 °C (98.8 °F) 67 16 122/69   97 %     Vitals Min/Max Last 24 Hours:  Temp  Min: 37 °C (98.6 °F)  Max: 37.2 °C (99 °F)  Pulse  Min: 67  Max: 97  Resp  Min: 16  Max: 16  BP  Min: 113/64  Max: 122/69    Intake/Output:  No intake or output data in the 24 hours ending 23 1740    Physical Examination:  CERVIX: closed and thick; MEMBRANES are Intact  Psoterior, difficult to assess  Lab Review:

## 2023-11-29 NOTE — H&P
Obstetrical Admission History and Physical    Assessment and Plan:  Luma Krishnamurthy is a 33 y.o.  at 40w4d.   Presents for IOL    Admit to L&D  Continuous fetal monitoring  Place IV, start IV Fluids  CBC, T&S ordered  Cytotec for cervical ripening, then Pitocin per protocol if needed  GBS is negative, no treatment indicated  Pain management as needed     HPI   Luma Krishnamurthy is a 33 y.o.  at 40w4d. ANGELICA: 2023, by Ultrasound. She has had prenatal care with Dr. Brock .    Principal Problem:    Term pregnancy      Pregnancy Problems (from 23 to present)       Problem Noted Resolved    Term pregnancy 2023 by Lyle Brock MD No    Priority:  Medium              Subjective   Good fetal movement. Denies vaginal bleeding., Denies contractions., Denies leaking of fluid.     Has known history of HSV. Has never had an outbreak though. Taking Valtrex.    Reason for Induction of Labor:  Pregnancy at 39 weeks or greater for induction      Obstetrical History   OB History    Para Term  AB Living   3 2 2     2   SAB IAB Ectopic Multiple Live Births                  # Outcome Date GA Lbr [place]/2nd Weight Sex Delivery Anes PTL Lv   3 Current            2 Term 10/31/18 39w0d  2892 g M Vag-Spont EPI N    1 Term 07/22/15 40w0d  3090 g M Vag-Spont EPI N        Past Medical History  Past Medical History:   Diagnosis Date    Anxiety disorder, unspecified 2014    Anxiety    Deficiency of other specified B group vitamins 2019    Vitamin B12 deficiency    Encounter for full-term uncomplicated delivery      (spontaneous vaginal delivery)    Hx of ulcerative colitis     Personal history of other diseases of the digestive system 2018    History of proctitis    Personal history of other diseases of the digestive system 2019    History of gastroesophageal reflux (GERD)    Personal history of other diseases of the digestive system 2014    History of ulcerative colitis     Personal history of other drug therapy     History of vaccination against human papillomavirus    Personal history of other infectious and parasitic diseases 09/25/2018    History of herpes simplex infection    Personal history of other infectious and parasitic diseases 03/25/2014    History of herpes simplex type 2 infection    Personal history of other mental and behavioral disorders 10/12/2017    History of depression    Personal history of other specified conditions     History of abnormal Pap smear        Past Surgical History   Past Surgical History:   Procedure Laterality Date    DILATION AND CURETTAGE OF UTERUS  12/2017    DILATION AND CURETTAGE OF UTERUS  06/2022    HERNIA REPAIR  08/22/2017    Hernia Repair    OTHER SURGICAL HISTORY  10/12/2017    Biopsy Endometrial, Without Cervical Dilation    OTHER SURGICAL HISTORY  08/22/2017    Upper Gastrointestinal Endoscopy, Simple Primary Exam    OTHER SURGICAL HISTORY  08/24/2017    Colposcopy Cervix With Biopsy(S)    OTHER SURGICAL HISTORY  02/01/2017    Surgery Excision Lipoma    UMBILICAL HERNIA REPAIR  02/2017       Social History  Social History     Tobacco Use    Smoking status: Former     Types: Cigarettes    Smokeless tobacco: Never   Substance Use Topics    Alcohol use: Not Currently     Substance and Sexual Activity   Drug Use Never       Allergies  Keflex [cephalexin]     Medications  Facility-Administered Medications Prior to Admission   Medication Dose Route Frequency Provider Last Rate Last Admin    oxytocin (Pitocin) infusion 30 units/ 500 mL NS  2-30 ira-units/min intravenous Continuous Lyle Brock MD         Medications Prior to Admission   Medication Sig Dispense Refill Last Dose    multivit-min/ferrous fumarate (MULTI VITAMIN ORAL) Take 1 tablet by mouth once daily.       NON FORMULARY Liadla 1.2 gram 2 times daily       prenatal vit no.124/iron/folic (PRENATAL VITAMIN ORAL) Take by mouth.       valACYclovir (Valtrex) 500 mg tablet Take 1  tablet (500 mg) by mouth once daily. 30 tablet 1        Objective    Last Vitals  Temp Pulse Resp BP MAP O2 Sat     97   121/81   98 %     Physical Examination  Constitutional: Alert and in no acute distress.   Heart: Regular rate and rhythm  Pulmonary: No respiratory distress. Clear to auscultation.  Abdomen: Soft, nontender; gravid  Genitourinary:   External genitalia: Normal appearance.   Vagina: Speculum exam done: no evidence of HSV  Musculoskeletal: No joint swelling seen, normal movements of all extremities.  Skin: Normal skin color and pigmentation, normal skin turgor, and no rash.  Psychiatric: Alert and oriented x 3. Affect normal to patient's baseline. Mood: Appropriate.    Obstetric:  FHR: Category 1, moderate variability  [place]: no contractions  Cervix: fingertip/50/-3

## 2023-11-29 NOTE — CARE PLAN
The patient's goals for the shift include  vaginal birth    The clinical goals for the shift include Vaginal birth of viable infant    Over the shift, the patient did not make progress toward the following goals. Barriers to progression include none. Recommendations to address these barriers include none.

## 2023-11-30 ENCOUNTER — ANESTHESIA (OUTPATIENT)
Dept: OBSTETRICS AND GYNECOLOGY | Facility: HOSPITAL | Age: 33
End: 2023-11-30
Payer: COMMERCIAL

## 2023-11-30 ENCOUNTER — ANESTHESIA EVENT (OUTPATIENT)
Dept: OBSTETRICS AND GYNECOLOGY | Facility: HOSPITAL | Age: 33
End: 2023-11-30
Payer: COMMERCIAL

## 2023-11-30 PROBLEM — E66.9 OBESITY: Status: ACTIVE | Noted: 2023-11-30

## 2023-11-30 PROCEDURE — 2500000004 HC RX 250 GENERAL PHARMACY W/ HCPCS (ALT 636 FOR OP/ED): Performed by: NURSE ANESTHETIST, CERTIFIED REGISTERED

## 2023-11-30 PROCEDURE — A59410 PR OBSTE CARE,VAG DELIV+POSTPARTUM: Performed by: NURSE ANESTHETIST, CERTIFIED REGISTERED

## 2023-11-30 PROCEDURE — 51701 INSERT BLADDER CATHETER: CPT

## 2023-11-30 PROCEDURE — 1120000001 HC OB PRIVATE ROOM DAILY

## 2023-11-30 PROCEDURE — 2500000004 HC RX 250 GENERAL PHARMACY W/ HCPCS (ALT 636 FOR OP/ED): Performed by: OBSTETRICS & GYNECOLOGY

## 2023-11-30 PROCEDURE — 2500000005 HC RX 250 GENERAL PHARMACY W/O HCPCS: Performed by: NURSE ANESTHETIST, CERTIFIED REGISTERED

## 2023-11-30 PROCEDURE — 59400 OBSTETRICAL CARE: CPT | Performed by: OBSTETRICS & GYNECOLOGY

## 2023-11-30 PROCEDURE — 3700000001 HC GENERAL ANESTHESIA TIME - INITIAL BASE CHARGE: Performed by: NURSE ANESTHETIST, CERTIFIED REGISTERED

## 2023-11-30 PROCEDURE — 59409 OBSTETRICAL CARE: CPT | Performed by: OBSTETRICS & GYNECOLOGY

## 2023-11-30 PROCEDURE — 3700000002 HC GENERAL ANESTHESIA TIME - EACH INCREMENTAL 1 MINUTE: Performed by: NURSE ANESTHETIST, CERTIFIED REGISTERED

## 2023-11-30 PROCEDURE — 2500000005 HC RX 250 GENERAL PHARMACY W/O HCPCS: Performed by: OBSTETRICS & GYNECOLOGY

## 2023-11-30 PROCEDURE — 2500000001 HC RX 250 WO HCPCS SELF ADMINISTERED DRUGS (ALT 637 FOR MEDICARE OP): Performed by: OBSTETRICS & GYNECOLOGY

## 2023-11-30 RX ORDER — OXYTOCIN 10 [USP'U]/ML
10 INJECTION, SOLUTION INTRAMUSCULAR; INTRAVENOUS ONCE AS NEEDED
Status: DISCONTINUED | OUTPATIENT
Start: 2023-11-30 | End: 2023-12-01 | Stop reason: HOSPADM

## 2023-11-30 RX ORDER — SIMETHICONE 80 MG
80 TABLET,CHEWABLE ORAL 4 TIMES DAILY PRN
Status: DISCONTINUED | OUTPATIENT
Start: 2023-11-30 | End: 2023-12-01 | Stop reason: HOSPADM

## 2023-11-30 RX ORDER — LOPERAMIDE HYDROCHLORIDE 2 MG/1
4 CAPSULE ORAL EVERY 2 HOUR PRN
Status: DISCONTINUED | OUTPATIENT
Start: 2023-11-30 | End: 2023-12-01 | Stop reason: HOSPADM

## 2023-11-30 RX ORDER — TRANEXAMIC ACID 100 MG/ML
1000 INJECTION, SOLUTION INTRAVENOUS ONCE AS NEEDED
Status: DISCONTINUED | OUTPATIENT
Start: 2023-11-30 | End: 2023-12-01 | Stop reason: HOSPADM

## 2023-11-30 RX ORDER — DIPHENHYDRAMINE HCL 25 MG
25 CAPSULE ORAL EVERY 6 HOURS PRN
Status: DISCONTINUED | OUTPATIENT
Start: 2023-11-30 | End: 2023-12-01 | Stop reason: HOSPADM

## 2023-11-30 RX ORDER — FENTANYL/ROPIVACAINE/NS/PF 2MCG/ML-.2
PLASTIC BAG, INJECTION (ML) INJECTION
Status: COMPLETED
Start: 2023-11-30 | End: 2023-11-30

## 2023-11-30 RX ORDER — NIFEDIPINE 10 MG/1
10 CAPSULE ORAL ONCE AS NEEDED
Status: DISCONTINUED | OUTPATIENT
Start: 2023-11-30 | End: 2023-12-01 | Stop reason: HOSPADM

## 2023-11-30 RX ORDER — DIPHENHYDRAMINE HYDROCHLORIDE 50 MG/ML
25 INJECTION INTRAMUSCULAR; INTRAVENOUS EVERY 6 HOURS PRN
Status: DISCONTINUED | OUTPATIENT
Start: 2023-11-30 | End: 2023-12-01 | Stop reason: HOSPADM

## 2023-11-30 RX ORDER — MAGNESIUM HYDROXIDE 2400 MG/10ML
10 SUSPENSION ORAL
Status: DISCONTINUED | OUTPATIENT
Start: 2023-11-30 | End: 2023-12-01 | Stop reason: HOSPADM

## 2023-11-30 RX ORDER — POLYETHYLENE GLYCOL 3350 17 G/17G
17 POWDER, FOR SOLUTION ORAL 2 TIMES DAILY PRN
Status: DISCONTINUED | OUTPATIENT
Start: 2023-11-30 | End: 2023-12-01 | Stop reason: HOSPADM

## 2023-11-30 RX ORDER — LIDOCAINE HCL/EPINEPHRINE/PF 2%-1:200K
VIAL (ML) INJECTION AS NEEDED
Status: DISCONTINUED | OUTPATIENT
Start: 2023-11-30 | End: 2023-11-30

## 2023-11-30 RX ORDER — METHYLERGONOVINE MALEATE 0.2 MG/ML
0.2 INJECTION INTRAVENOUS ONCE AS NEEDED
Status: DISCONTINUED | OUTPATIENT
Start: 2023-11-30 | End: 2023-12-01 | Stop reason: HOSPADM

## 2023-11-30 RX ORDER — MISOPROSTOL 200 UG/1
800 TABLET ORAL ONCE AS NEEDED
Status: DISCONTINUED | OUTPATIENT
Start: 2023-11-30 | End: 2023-12-01 | Stop reason: HOSPADM

## 2023-11-30 RX ORDER — IBUPROFEN 600 MG/1
600 TABLET ORAL EVERY 6 HOURS
Status: DISCONTINUED | OUTPATIENT
Start: 2023-11-30 | End: 2023-12-01 | Stop reason: HOSPADM

## 2023-11-30 RX ORDER — ONDANSETRON HYDROCHLORIDE 2 MG/ML
4 INJECTION, SOLUTION INTRAVENOUS EVERY 6 HOURS PRN
Status: DISCONTINUED | OUTPATIENT
Start: 2023-11-30 | End: 2023-12-01 | Stop reason: HOSPADM

## 2023-11-30 RX ORDER — HYDRALAZINE HYDROCHLORIDE 20 MG/ML
5 INJECTION INTRAMUSCULAR; INTRAVENOUS ONCE AS NEEDED
Status: DISCONTINUED | OUTPATIENT
Start: 2023-11-30 | End: 2023-12-01 | Stop reason: HOSPADM

## 2023-11-30 RX ORDER — CARBOPROST TROMETHAMINE 250 UG/ML
250 INJECTION, SOLUTION INTRAMUSCULAR ONCE AS NEEDED
Status: DISCONTINUED | OUTPATIENT
Start: 2023-11-30 | End: 2023-12-01 | Stop reason: HOSPADM

## 2023-11-30 RX ORDER — FENTANYL/ROPIVACAINE/NS/PF 2MCG/ML-.2
0-25 PLASTIC BAG, INJECTION (ML) INJECTION CONTINUOUS
Status: DISCONTINUED | OUTPATIENT
Start: 2023-11-30 | End: 2023-11-30

## 2023-11-30 RX ORDER — OXYTOCIN/0.9 % SODIUM CHLORIDE 30/500 ML
60 PLASTIC BAG, INJECTION (ML) INTRAVENOUS ONCE AS NEEDED
Status: DISCONTINUED | OUTPATIENT
Start: 2023-11-30 | End: 2023-12-01 | Stop reason: HOSPADM

## 2023-11-30 RX ORDER — BISACODYL 10 MG/1
10 SUPPOSITORY RECTAL DAILY PRN
Status: DISCONTINUED | OUTPATIENT
Start: 2023-11-30 | End: 2023-12-01 | Stop reason: HOSPADM

## 2023-11-30 RX ORDER — LIDOCAINE 560 MG/1
1 PATCH PERCUTANEOUS; TOPICAL; TRANSDERMAL
Status: DISCONTINUED | OUTPATIENT
Start: 2023-11-30 | End: 2023-12-01 | Stop reason: HOSPADM

## 2023-11-30 RX ORDER — ONDANSETRON 4 MG/1
4 TABLET, FILM COATED ORAL EVERY 6 HOURS PRN
Status: DISCONTINUED | OUTPATIENT
Start: 2023-11-30 | End: 2023-12-01 | Stop reason: HOSPADM

## 2023-11-30 RX ORDER — ACETAMINOPHEN 325 MG/1
975 TABLET ORAL EVERY 6 HOURS
Status: DISCONTINUED | OUTPATIENT
Start: 2023-11-30 | End: 2023-12-01 | Stop reason: HOSPADM

## 2023-11-30 RX ORDER — LABETALOL HYDROCHLORIDE 5 MG/ML
20 INJECTION, SOLUTION INTRAVENOUS ONCE AS NEEDED
Status: DISCONTINUED | OUTPATIENT
Start: 2023-11-30 | End: 2023-12-01 | Stop reason: HOSPADM

## 2023-11-30 RX ADMIN — SODIUM CHLORIDE, POTASSIUM CHLORIDE, SODIUM LACTATE AND CALCIUM CHLORIDE 125 ML/HR: 600; 310; 30; 20 INJECTION, SOLUTION INTRAVENOUS at 02:07

## 2023-11-30 RX ADMIN — ACETAMINOPHEN 975 MG: 325 TABLET ORAL at 18:31

## 2023-11-30 RX ADMIN — Medication 1 APPLICATION: at 08:21

## 2023-11-30 RX ADMIN — IBUPROFEN 600 MG: 600 TABLET, FILM COATED ORAL at 05:52

## 2023-11-30 RX ADMIN — Medication 3 ML: at 02:23

## 2023-11-30 RX ADMIN — ACETAMINOPHEN 975 MG: 325 TABLET ORAL at 12:17

## 2023-11-30 RX ADMIN — Medication 3 ML: at 02:04

## 2023-11-30 RX ADMIN — WITCH HAZEL 1 EACH: 500 SOLUTION RECTAL; TOPICAL at 08:21

## 2023-11-30 RX ADMIN — Medication 8 ML/HR: at 02:08

## 2023-11-30 RX ADMIN — IBUPROFEN 600 MG: 600 TABLET, FILM COATED ORAL at 12:18

## 2023-11-30 RX ADMIN — LIDOCAINE HYDROCHLORIDE AND EPINEPHRINE 3 ML: 20; 5 INJECTION, SOLUTION EPIDURAL; INFILTRATION; INTRACAUDAL; PERINEURAL at 02:01

## 2023-11-30 RX ADMIN — Medication 2 ML: at 02:06

## 2023-11-30 RX ADMIN — BENZOCAINE AND LEVOMENTHOL 1 APPLICATION: 200; 5 SPRAY TOPICAL at 08:21

## 2023-11-30 RX ADMIN — IBUPROFEN 600 MG: 600 TABLET, FILM COATED ORAL at 18:31

## 2023-11-30 RX ADMIN — ACETAMINOPHEN 975 MG: 325 TABLET ORAL at 05:51

## 2023-11-30 SDOH — HEALTH STABILITY: MENTAL HEALTH: CURRENT SMOKER: 0

## 2023-11-30 ASSESSMENT — PAIN SCALES - GENERAL
PAINLEVEL_OUTOF10: 5 - MODERATE PAIN
PAINLEVEL_OUTOF10: 0 - NO PAIN
PAINLEVEL_OUTOF10: 0 - NO PAIN
PAINLEVEL_OUTOF10: 3
PAINLEVEL_OUTOF10: 0 - NO PAIN
PAINLEVEL_OUTOF10: 10 - WORST POSSIBLE PAIN
PAINLEVEL_OUTOF10: 1
PAINLEVEL_OUTOF10: 3
PAINLEVEL_OUTOF10: 0 - NO PAIN
PAINLEVEL_OUTOF10: 0 - NO PAIN
PAINLEVEL_OUTOF10: 2
PAINLEVEL_OUTOF10: 3
PAINLEVEL_OUTOF10: 0 - NO PAIN

## 2023-11-30 ASSESSMENT — PAIN DESCRIPTION - LOCATION: LOCATION: OTHER (COMMENT)

## 2023-11-30 ASSESSMENT — PAIN DESCRIPTION - DESCRIPTORS
DESCRIPTORS: SORE
DESCRIPTORS: SORE

## 2023-11-30 ASSESSMENT — PAIN - FUNCTIONAL ASSESSMENT: PAIN_FUNCTIONAL_ASSESSMENT: 0-10

## 2023-11-30 NOTE — LACTATION NOTE
This note was copied from a baby's chart.  Lactation Consultant Note  Lactation Consultation  Reason for Consult: Initial assessment  Consultant Name: SHAWN Barajas RN, CBS    Maternal Information  Has mother  before?: Yes  How long did the mother previously breastfeed?: 5 months with supplementation  Previous Maternal Breastfeeding Challenges: Lack of support  Exclusive Pump and Bottle Feed: No    Maternal Assessment  Breast Assessment:  (Did not assess at this time.)    Infant Assessment  Infant Behavior: Deep sleep (swaddled in bassinet)  Infant Assessment:  (40.5 weeks, approximately 8 HOL, terminal [facility])    Feeding Assessment  Nutrition Source: Breastmilk  Feeding Method: Nursing at the breast  Unable to assess infant feeding at this time: Other (Comment) ([place] just completed a feed per mother.)    LATCH TOOL       Breast Pump  Pump: None    Other OB Lactation Tools       Patient Follow-up  Inpatient Lactation Follow-up Needed : Yes    Other OB Lactation Documentation       Recommendations/Summary  32 y/o  mother with vaginal delivery of  boy approximately 8 hours ago. Mother states she plans to breastfeed this  for 6 months. Mother states she  her second child for 5 months but also used formula for supplementation due to difficulty latching while in the hospital. Mother reports +breast changes during pregnancy and denies history of breast surgery. Mother states she has a pump at home and will be a stay-at-home mom.     LC to bedside to assess breastfeeding progress and review education. Mother states  has been feeding every 3 hours and denies pain or breakdown with latching. Mother states she feels that  could be latched deeper. Encouraged mother to call LC for next feed to have latch assessed since  just completed a feed. Mother agreeable.     Education reviewed at this time. Reviewed milk production, normal  feeding patterns in the first 24 hours  and 's stomach capacity. Reviewed feeding cues, waking techniques and signs to know  is eating enough. Reviewed signs of a deep and comfortable latch and adequate output. Reviewed frequency of feeds and importance of feeding  every 3 hours from the beginning of the previous feed or earlier with feeding cues. Discussed importance of skin to skin. Mother states understanding. Mother to call for next feed. Offered ongoing assistance with breastfeeding. Mother denies further questions or concerns at this time.

## 2023-11-30 NOTE — PROGRESS NOTES
Intrapartum Progress Note    Assessment/Plan   Luma Krishnamurthy is a 33 y.o.  at 40w5d. ANGELICA: 2023, by Ultrasound.     Fully dilated beginning to have the urge to push.  Vertex at spines.  Category 2 tracing with decelerations.  Moderate variability    Principal Problem:    Term pregnancy  Active Problems:    Obesity    Pregnancy Problems (from 23 to present)       Problem Noted Resolved    Term pregnancy 2023 by Lyle Brock MD No    Priority:  Medium              Subjective   Starting to have urge to push    Objective   Last Vitals:  Temp Pulse Resp BP MAP Pulse Ox   36.7 °C (98.1 °F) 96 18 114/69   (!) 90 %     Vitals Min/Max Last 24 Hours:  Temp  Min: 36.7 °C (98.1 °F)  Max: 37.2 °C (99 °F)  Pulse  Min: 65  Max: 101  Resp  Min: 16  Max: 18  BP  Min: 103/68  Max: 125/73    Intake/Output:  No intake or output data in the 24 hours ending 23 0333    Physical Examination:  Vaginal exam shows vertex at spines.    Lab Review:

## 2023-11-30 NOTE — CARE PLAN
The patient's goals for the shift include breastfeeding and rest.    The clinical goals for the shift include routine postpartum care.    Over the shift, the patient did not make progress toward the following goals. Barriers to progression include none. Recommendations to address these barriers include none.

## 2023-11-30 NOTE — L&D DELIVERY NOTE
OB Delivery Note  2023  Luma Krishnamurthy  33 y.o.           Gestational Age: 40w5d  /Para:   Quantitative Blood Loss: Admission to Discharge: 0 mL (2023  8:00 AM - 2023  4:55 AM)    Rigoberto KrishnamurthynPending [[id number]]      Labor Event Times    Dilation complete date/time: 2023 0245  Start pushing date/time: 2023 0325       Placenta    Placenta delivery date/time:   Placenta removal:        Anesthesia    Method: Epidural        Delivery    Birth date/time:   Delivery type:        Apgars    Living status:   Apgar Component Scores:  1 min.:  5 min.:  10 min.:  15 min.:  20 min.:    Skin color:         Heart rate:         Reflex irritability:         Muscle tone:         Respiratory effort:         Total:                Delivery Providers    Delivering clinician:    Provider Role     Delivery Nurse     Nursery Nurse     Resident                 Lyle Brock MD

## 2023-11-30 NOTE — ANESTHESIA PROCEDURE NOTES
Epidural Block    Patient location during procedure: OB  Start time: 11/30/2023 1:50 AM  End time: 11/30/2023 1:56 AM  Reason for block: labor analgesia  Staffing  Performed: CRNA   Authorized by: KEON Padilla    Performed by: KEON Padilla    Preanesthetic Checklist  Completed: patient identified, IV checked, risks and benefits discussed, surgical consent, pre-op evaluation, timeout performed and sterile techniques followed  Block Timeout  RN/Licensed healthcare professional reads aloud to the Anesthesia provider and entire team: Patient identity, procedure with side and site, patient position, and as applicable the availability of implants/special equipment/special requirements.  Patient on coagulant treatment: no  Timeout performed at: 11/30/2023 1:51 AM  Block Placement  Patient position: sitting  Prep: ChloraPrep  Sterility prep: cap, drape, gloves, hand and mask  Sedation level: no sedation  Patient monitoring: blood pressure, continuous pulse oximetry and heart rate  Approach: midline  Local numbing: lidocaine 1% to skin and subcutaneous tissues  Vertebral space: lumbar  Lumbar location: L3-L4  Epidural  Loss of resistance technique: saline  Guidance: landmark technique        Needle  Needle type: Tuohy   Needle gauge: 17  Needle length: 9 cm  Needle insertion depth: 6 cm  Catheter type: multi-orifice  Catheter size: 19 G  Catheter at skin depth: 12 cm  Catheter securement method: clear occlusive dressing    Test dose: lidocaine 1.5% with epinephrine 1-to-200,000  Test dose given at 11/30/2023 2:02 AM  Test dose: lidocaine 1.5% with epinephrine 1-to-200,000  Test dose result: no positive test dose            Assessment  Sensory level: T10 bilateral  Block outcome: pain improved  Number of attempts: 1  Events: no positive test dose  Procedure assessment: patient tolerated procedure well with no immediate complications

## 2023-11-30 NOTE — ANESTHESIA PREPROCEDURE EVALUATION
Patient: Luma Krishnamurthy    Evaluation Method: In-person visit    Procedure Information    Date: 11/30/23  Procedure: Labor Analgesia         Relevant Problems   Cardiovascular   (+) Heart murmur      Endocrine   (+) Obesity      GI   (+) GERD (gastroesophageal reflux disease)   (+) Ulcerative colitis (CMS/HCC)      Neuro/Psych   (+) Anxiety   (+) Depression      Hematology   (+) Pernicious anemia      Infectious Disease   (+) HSV (herpes simplex virus) infection   (+) Vaginal candidiasis       Clinical information reviewed:   Tobacco  Allergies  Meds   Med Hx  Surg Hx   Fam Hx  Soc Hx        NPO Detail:  No data recorded     OB/Gyn Evaluation    Present Pregnancy    Patient is pregnant now.   Obstetric History                Physical Exam    Airway  Mallampati: II     Cardiovascular   Rhythm: regular  Rate: normal     Dental    Pulmonary    Abdominal            Anesthesia Plan    ASA 2     epidural     The patient is not a current smoker.    Anesthetic plan and risks discussed with patient.

## 2023-11-30 NOTE — LACTATION NOTE
This note was copied from a baby's chart.  Lactation Consultant Note  Lactation Consultation  Reason for Consult: Follow-up assessment  Consultant Name: SHAWN Barajas RN, CBS    Maternal Information  Exclusive Pump and Bottle Feed: No    Maternal Assessment  Breast Assessment: Medium, Soft, Warm, Compressible  Nipple Assessment: Intact, Erect, Short, Erect with stimulation  Areola Assessment: Bruised (bruise on right areola)    Infant Assessment  Infant Behavior: Sleepy, Rooting response, Gaggy/spitty, Feeding cues observed    Feeding Assessment  Nutrition Source: Breastmilk  Feeding Method: Nursing at the breast  Feeding Position: Baby led, Breast sandwich, Cradle, Cross - cradle, Skin to skin, Both sides, Nipple to nose, Infant not tucked close and facing mother, Mother needs assistance with latch/positioning, Misalignment of baby's head, trunk, and hips  Suck/Feeding: Sustained, Coordinated suck/swallow/breathe, Baby led rhythmically  Latch Assessment: Moderate assistance is needed, Instructed on deep latch, Eagerly grasped on to latch, Shallow latch, Deep latch obtained, Optimal angle of mouth opening, Comfortable with no pain, Sucking and swallowing, Sucks with long jaw movement, Bursts of sucking, swallowing, and rest, Flanged lips, Chin moves in rhythmic motion, Comfortable latch    LATCH TOOL  Latch: Grasps breast, tongue down, lips flanged, rhythmic sucking  Audible Swallowing: A few with stimulation  Type of Nipple: Everted (After stimulation)  Comfort (Breast/Nipple): Soft/non-tender  Hold (Positioning): Minimal assist, teach one side, mother does other, staff holds  LATCH Score: 8    Breast Pump       Other OB Lactation Tools       Patient Follow-up  Inpatient Lactation Follow-up Needed : Yes  Outpatient Lactation Follow-up: Recommended    Other OB Lactation Documentation       Recommendations/Summary  LC called to bedside. Mother states  is beginning to show feeding cues.  being held by  mother, wrapped with blanket. LC encouraged mother to unswaddle  and place  directly skin to skin and then attempt to latch . Mother receptive and latching  in cradle hold with breast shaping,  high at the breast and tucking head forward and facing away from mother. LC offered to assist. Mother agreeable. LC reviewed alignment of belly to belly and nipple to nose and keeping  tucked close. With mother's permission, LC assisted with repositioning  to the breast in cross cradle hold with breast shaping. [place] eager and initially slurping onto the nipple. LC reviewed not latching  until  opens mouth at a wide enough angle and then bringing  up and over the breast. With some assistance from LC, mother able to latch  deeply to the breast. Deep latch observed with active sucking and swallowing with lips flanged. Mother states this latch is more comfortable than previous latches and states she can feel a stronger tugging. Reviewed hand placement and lowering to the base of the neck and shoulders. Mother keeping hand in current spot. Encouraged mother to allow  to continue feeding at the first breast until  appears satiated and then offering the second breast. Mother states understanding and agreeable to call LC should she need assistance with latching  to the second breast or for any subsequent feeds. Offered ongoing assistance with breastfeeding. Mother denies further questions or concerns at this time.    1510 LC back to bedside to check on mother. Mother states  sustained latch for approximately 10 minutes at the left breast before falling asleep. Mother currently latching  to the right breast in cradle hold,  not tucked close and facing away from mother. Mother states this position felt comfortable but reports a bruise on the right breast. Right areola noted to have a bruise. LC offered to assist  with adjusting 's latch. Mother agreeable. Alignment reviewed and assisted mother to cross cradle hold. LC then unlatched  with a gloved finger and reviewed brushing the nipple to 's upper lip before latching and not allowing  to slurp onto the nipple. Moderate assistance provided and  eager to latch and deep latch obtained. Deep latch observed with active sucking and swallowing and lips flanged. Encouraged mother to allow  to continue feeding and not limiting time at the breast. Mother states understanding. Offered ongoing assistance with breastfeeding. Mother denies further questions or concerns at this time.

## 2023-12-01 VITALS
HEIGHT: 60 IN | TEMPERATURE: 98.2 F | HEART RATE: 68 BPM | OXYGEN SATURATION: 97 % | SYSTOLIC BLOOD PRESSURE: 127 MMHG | BODY MASS INDEX: 33.96 KG/M2 | DIASTOLIC BLOOD PRESSURE: 72 MMHG | WEIGHT: 172.95 LBS | RESPIRATION RATE: 16 BRPM

## 2023-12-01 PROBLEM — N92.6 IRREGULAR MENSES: Status: RESOLVED | Noted: 2023-03-17 | Resolved: 2023-12-01

## 2023-12-01 PROBLEM — Z3A.22 22 WEEKS GESTATION OF PREGNANCY (HHS-HCC): Status: RESOLVED | Noted: 2023-07-26 | Resolved: 2023-12-01

## 2023-12-01 PROBLEM — Z34.90 TERM PREGNANCY (HHS-HCC): Status: RESOLVED | Noted: 2023-11-29 | Resolved: 2023-12-01

## 2023-12-01 PROBLEM — R20.2 NUMBNESS AND TINGLING: Status: RESOLVED | Noted: 2023-03-17 | Resolved: 2023-12-01

## 2023-12-01 PROBLEM — R20.0 NUMBNESS AND TINGLING: Status: RESOLVED | Noted: 2023-03-17 | Resolved: 2023-12-01

## 2023-12-01 PROBLEM — N92.0 HEAVY PERIODS: Status: RESOLVED | Noted: 2023-03-17 | Resolved: 2023-12-01

## 2023-12-01 PROCEDURE — 2500000001 HC RX 250 WO HCPCS SELF ADMINISTERED DRUGS (ALT 637 FOR MEDICARE OP): Performed by: OBSTETRICS & GYNECOLOGY

## 2023-12-01 RX ORDER — IBUPROFEN 200 MG
600 TABLET ORAL EVERY 6 HOURS
COMMUNITY
Start: 2023-12-01

## 2023-12-01 RX ADMIN — ACETAMINOPHEN 975 MG: 325 TABLET ORAL at 00:46

## 2023-12-01 RX ADMIN — IBUPROFEN 600 MG: 600 TABLET, FILM COATED ORAL at 00:46

## 2023-12-01 RX ADMIN — ACETAMINOPHEN 975 MG: 325 TABLET ORAL at 06:29

## 2023-12-01 RX ADMIN — IBUPROFEN 600 MG: 600 TABLET, FILM COATED ORAL at 06:29

## 2023-12-01 ASSESSMENT — PAIN SCALES - GENERAL
PAINLEVEL_OUTOF10: 3
PAINLEVEL_OUTOF10: 3

## 2023-12-01 ASSESSMENT — PAIN - FUNCTIONAL ASSESSMENT
PAIN_FUNCTIONAL_ASSESSMENT: 0-10
PAIN_FUNCTIONAL_ASSESSMENT: 0-10

## 2023-12-01 ASSESSMENT — PAIN DESCRIPTION - LOCATION: LOCATION: ABDOMEN

## 2023-12-01 ASSESSMENT — PAIN DESCRIPTION - DESCRIPTORS: DESCRIPTORS: SORE

## 2023-12-01 NOTE — LACTATION NOTE
This note was copied from a baby's chart.  Lactation Consultant Note         Recommendations/Summary  LC to bedside to follow up on breastfeeding progress. Mother states confidence that breastfeeding is continuing to go well and denies any further pain or breakdown with latching. Breastfeeding handouts provided. Offered ongoing assistance with breastfeeding. Mother denies further questions or concerns at this time.

## 2023-12-01 NOTE — DISCHARGE SUMMARY
Discharge Summary    Admission Date: 2023  Discharge Date: 2023    Discharge Diagnosis  Term pregnancy    Hospital Course  Delivery Date: 2023  4:42 AM   Delivery type: Vaginal, Spontaneous    GA at delivery: 40w5d  Outcome: Living   Anesthesia during delivery: Epidural   Intrapartum complications: None   Feeding method: Breastfeeding Status: Yes    32 yo  40 5/7 wks  presented for IOL.  Delivered baby boy, .  Uncomplicated postpartum course.    Discharge Meds     Your medication list        START taking these medications        Instructions Last Dose Given Next Dose Due   ibuprofen 200 mg tablet      Take 3 tablets (600 mg) by mouth every 6 hours.              CONTINUE taking these medications        Instructions Last Dose Given Next Dose Due   MULTI VITAMIN ORAL                  ASK your doctor about these medications        Instructions Last Dose Given Next Dose Due   valACYclovir 500 mg tablet  Commonly known as: Valtrex      Take 1 tablet (500 mg) by mouth once daily.                 Where to Get Your Medications        You can get these medications from any pharmacy    You don't need a prescription for these medications  ibuprofen 200 mg tablet          Test Results Pending At Discharge  Pending Labs       No current pending labs.            Outpatient Follow-Up  Call Dr. Brock's office to make 6 wk postpartum visit.       Ramona De La Cruz MD

## 2023-12-01 NOTE — PROGRESS NOTES
Postpartum Progress Note    Assessment/Plan   Luma Krishnamurthy is a 33 y.o., , who delivered at 40w5d gestation and is now postpartum day 1 s/p .  Discharge home.  Followup 6 weeks postpartum with Dr. Brock.    Subjective     Feels well. Wants to go home.  Moderate bleeding per pt.   Breastfeeding going well.     Objective     Recent /64    BP Min/Max Last 24 Hours:  BP  Min: 106/70  Max: 116/72    Physical Exam:  General: Examination reveals a well developed, well nourished, female, in no acute distress. She is alert and cooperative.  Abdomen: soft, non tender, non distended. Fundus firm, non tender, below umbilicus.  Ext: No erythema, no tenderness.

## 2023-12-01 NOTE — LACTATION NOTE
This note was copied from a baby's chart.     12/01/23 1000   Lactation Consultation   Reason for Consult Follow-up assessment   Consultant Name Nitin Olson   Maternal Information   Has mother  before? Yes   How long did the mother previously breastfeed? 5 months (along with supplementation- 50% BF, 50% formula per mother)   Previous Maternal Breastfeeding Challenges Breast/nipple pain;Lack of support   Exclusive Pump and Bottle Feed No   WIC Program No   Maternal Assessment   Breast Assessment Medium;Symmetrical;Filling;Compressible   Nipple Assessment Intact;Sore   Alterations in Nipple Condition   (reports soreness, denies pain during this feeding, no skin breakdown present on nipples at this time)   Areola Assessment Bruised  (bruise noted on left areola, mother counseled on importance of ensuring deep and effecive latch)   Infant Assessment   Infant Behavior Awake;Active alert;Readiness to feed;Feeding cues observed;Rooting response   Infant Assessment   (full term infant, approximately 29 hours old, weight loss 1.8%, 2 voids and 2 stools in the last 24 hours)   Feeding Assessment   Nutrition Source Breastmilk   Feeding Method Nursing at the breast   Feeding Position Cradle;Mother needs assistance with latch/positioning   Suck/Feeding Sustained;Coordinated suck/swallow/breathe;Baby led rhythmically  (active suck and frequent visible/audible swallowing noted)   Latch Assessment Minimal assistance is needed;Instructed on deep latch;Eagerly grasped on to latch;Deep latch obtained;Optimal angle of mouth opening;Comfortable with no pain;Sucking and swallowing;Sucks with long jaw movement;Chin and lower lip contact breast first;Flanged lips;Chin moves in rhythmic motion   LATCH Tool   Latch 2   Audible Swallowing 2   Type of Nipple 2   Comfort (Breast/Nipple) 1   Hold (Positioning) 1   LATCH Score 8   Other OB Lactation Tools   Lactation Tools Lanolin   Patient Follow-Up   Inpatient Lactation Follow-up  Needed  No   Outpatient Lactation Follow-up Recommended  (due to previous latch/nipple pain issues with her older child)   Lactation Professional - OK to Discharge Yes   33 year old  experienced breastfeeding mother and infant preparing for discharge. Met with parents for lactation consult to assess breastfeeding progress, to address any questions and/or concerns and to offer lactation assistance, support and education as needed and desired prior to discharge.    Mother reports continued difficulty with positioning, latch and comfort. States she does not like cross-cradle hold and she has been unable to get infant to latch deeply enough and comfortably in the cradle hold. Assisted with positioning and deep latch techniques. Mother taught how to ensure baby is in proper position and alignment in cradle hold. Reviewed deep latch education-nipple to nose positioning, ensuring infant opens mouth wide enough, shaping breast far back behind areola, aiming nipple for roof of infant's mouth and bringing infant to the breast quickly and closely, ensuring adequate amount of breast tissue is in infant's mouth.    Deep and sustained latch achieved. Mother verbalizes immediate increase in comfort compared to prior feedings. Infant calm and content at the breast with active suck and frequent visible and audible swallowing. Parents pleased. Verbalize increased confidence prior to discharge.    Breastfeeding written and verbal discharge education reviewed throughout consult. Parents provided with the opportunity to ask questions. All questions answered. See education flow sheet for detailed list of education topics covered. Reviewed importance of frequent skin to skin contact, waking techniques, infant stomach capacity, value of breast milk feeds as well as typical  feeding patterns and behaviors in the first few weeks of life. Encouraged frequent skin to skin and nursing with cues and at least 8-12 times or more per 24  hours. Reviewed signs of adequate intake/output. Reviewed resources for lactation follow-up and support after discharge. Parents deny any further questions or concerns at this time. Verbalize confidence with breastfeeding prior to discharge. Offered ongoing breastfeeding assistance, support and education as needed and desired.

## 2023-12-01 NOTE — CARE PLAN
The patient's goals for the shift include breastfeeding and rest    The clinical goals for the shift include routine postpartum care    Over the shift, the patient did not make progress toward the following goals. Barriers to progression include none. Recommendations to address these barriers include none.

## 2023-12-05 ENCOUNTER — APPOINTMENT (OUTPATIENT)
Dept: OBSTETRICS AND GYNECOLOGY | Facility: CLINIC | Age: 33
End: 2023-12-05
Payer: COMMERCIAL

## 2023-12-06 ENCOUNTER — TELEPHONE (OUTPATIENT)
Dept: OBSTETRICS AND GYNECOLOGY | Facility: HOSPITAL | Age: 33
End: 2023-12-06
Payer: COMMERCIAL

## 2023-12-06 ASSESSMENT — PAIN SCALES - GENERAL: PAIN_LEVEL: 2

## 2023-12-06 NOTE — PROGRESS NOTES
Follow-Up Note    Care Type: Women's Health  Phone Number Called: [phone]    Call Outcome (connected, left message, no answer, phone disconnected): Left Message.    Patient reports feeling symptoms are (better, worse, same):     After returning home from the hospital, was it clear to you:     Which Meds were new Meds?   Which Meds to continue?   Which Meds to stop?   Who participated in medication reconciliation with the hospital staff (patient, family, other, no one):     To be answered by care coordinator or staff member doing call back:     In your professional opinion, do you think there was a medication discrepancy or potential for medication discrepancy in this situation?     Medication issues (none, confusion which medications to take, non-adherence to medication, prescription unfilled-inadequate funds, prescription unfilled-pharmacy will not fill (prescription unfilled-unable to get to pharmacy, troubled by side effects, other-see comments):     Discharge Instructions were clear:    Patient has a primary care provider:   Post-hospital follow-up occurred according to schedule:   Reason (appointment scheduled in future, appointment was never scheduled-encouraged patient to call, no appointment available within discharge plan, patient missed appointment):     Delivered baby(ies):     Chest Pain?:  SOB or difficulty breathing?:   Seizures?:    Any thoughts of hurting yourself or your baby?:  Bleeding that is soaking through one pad/hour or blood clots the size of an egg or larger?:  Incision that is not healing?    Red or swollen leg that is painful or warm to the touch?   Temperature of 100.4*F or higher?:  Headache that does not get better, even after taking medicine, or a bad headache with vision changes?:    Where or in what is your baby sleeping?  ABC's of sleep covered?     How are you feeding your baby(ies)-breast, EBM, formula, supplementation?:   Baby getting anything other than breastmilk or  formula for food?    Patient has Primary Care Provider for baby(ies)?   Baby has been seen by a health care provider since discharge?  If no, reason (appointment scheduled in the future, appointment was never scheduled, no appointment available within discharge plan, patient missed appointment):     Mom's Discharge Date: 11/29/2023  Date Baby was seen by provider:    Patient has specific name and number of who to call for concerns?:     Date/Time of Call: 12/6/2023 @ 1520  Call back done by (care coordinator, relationship based nurse, patient returned call, , lactation consultant, manager/supervisor, patient navigator, social work, other-see comments): Lactation Consultant      Comments:               Attempt Date 1: 12/6/2023  Call Attempt 1 outcome: Left Message.      Attempt Date 2: 12/13/2023  Call Attempt 2 outcome: Left Message.

## 2023-12-06 NOTE — ANESTHESIA POSTPROCEDURE EVALUATION
Patient: Luma Krishnamurthy    Procedure Summary       Date: 11/30/23 Room / Location:     Anesthesia Start: 0150 Anesthesia Stop: 0442    Procedure: Labor Analgesia Diagnosis:     Scheduled Providers:  Responsible Provider: KEON Padilla    Anesthesia Type: epidural ASA Status: 2            Anesthesia Type: epidural      Anesthesia Post Evaluation    Patient location during evaluation: bedside  Patient participation: complete - patient participated  Level of consciousness: awake and alert  Pain score: 2  Pain management: satisfactory to patient  Airway patency: patent  Cardiovascular status: acceptable  Respiratory status: acceptable  Hydration status: acceptable  Postoperative Nausea and Vomiting: none        No notable events documented.

## 2023-12-18 RX ORDER — ENOXAPARIN SODIUM 100 MG/ML
40 INJECTION SUBCUTANEOUS EVERY 24 HOURS
OUTPATIENT
Start: 2023-12-18

## 2023-12-18 RX ORDER — HYDRALAZINE HYDROCHLORIDE 20 MG/ML
5 INJECTION INTRAMUSCULAR; INTRAVENOUS ONCE AS NEEDED
OUTPATIENT
Start: 2023-12-18

## 2023-12-18 RX ORDER — METHYLERGONOVINE MALEATE 0.2 MG/ML
0.2 INJECTION INTRAVENOUS ONCE AS NEEDED
OUTPATIENT
Start: 2023-12-18

## 2023-12-18 RX ORDER — MISOPROSTOL 200 UG/1
800 TABLET ORAL ONCE AS NEEDED
OUTPATIENT
Start: 2023-12-18

## 2023-12-18 RX ORDER — ONDANSETRON HYDROCHLORIDE 2 MG/ML
4 INJECTION, SOLUTION INTRAVENOUS EVERY 6 HOURS PRN
OUTPATIENT
Start: 2023-11-06

## 2023-12-18 RX ORDER — TERBUTALINE SULFATE 1 MG/ML
0.25 INJECTION SUBCUTANEOUS ONCE AS NEEDED
OUTPATIENT
Start: 2023-12-18

## 2023-12-18 RX ORDER — ONDANSETRON 4 MG/1
4 TABLET, FILM COATED ORAL EVERY 6 HOURS PRN
OUTPATIENT
Start: 2023-11-06

## 2023-12-18 RX ORDER — CARBOPROST TROMETHAMINE 250 UG/ML
250 INJECTION, SOLUTION INTRAMUSCULAR ONCE AS NEEDED
OUTPATIENT
Start: 2023-12-18

## 2023-12-18 RX ORDER — METOCLOPRAMIDE HYDROCHLORIDE 5 MG/ML
10 INJECTION INTRAMUSCULAR; INTRAVENOUS EVERY 6 HOURS PRN
OUTPATIENT
Start: 2023-11-06

## 2023-12-18 RX ORDER — METOCLOPRAMIDE 10 MG/1
10 TABLET ORAL EVERY 6 HOURS PRN
OUTPATIENT
Start: 2023-11-06

## 2023-12-18 RX ORDER — LABETALOL HYDROCHLORIDE 5 MG/ML
20 INJECTION, SOLUTION INTRAVENOUS ONCE AS NEEDED
OUTPATIENT
Start: 2023-12-18

## 2023-12-18 RX ORDER — LIDOCAINE HYDROCHLORIDE 10 MG/ML
30 INJECTION INFILTRATION; PERINEURAL ONCE AS NEEDED
OUTPATIENT
Start: 2023-12-18

## 2023-12-18 RX ORDER — NIFEDIPINE 10 MG/1
10 CAPSULE ORAL ONCE AS NEEDED
OUTPATIENT
Start: 2023-12-18

## 2023-12-18 RX ORDER — LOPERAMIDE HYDROCHLORIDE 2 MG/1
4 CAPSULE ORAL EVERY 2 HOUR PRN
OUTPATIENT
Start: 2023-12-18

## 2023-12-18 RX ORDER — OXYTOCIN 10 [USP'U]/ML
10 INJECTION, SOLUTION INTRAMUSCULAR; INTRAVENOUS ONCE AS NEEDED
OUTPATIENT
Start: 2023-12-18

## 2023-12-18 RX ORDER — SODIUM CHLORIDE, SODIUM LACTATE, POTASSIUM CHLORIDE, CALCIUM CHLORIDE 600; 310; 30; 20 MG/100ML; MG/100ML; MG/100ML; MG/100ML
125 INJECTION, SOLUTION INTRAVENOUS CONTINUOUS
OUTPATIENT
Start: 2023-12-18

## 2023-12-18 RX ORDER — TRANEXAMIC ACID 100 MG/ML
1000 INJECTION, SOLUTION INTRAVENOUS ONCE AS NEEDED
OUTPATIENT
Start: 2023-12-18

## 2024-06-18 ASSESSMENT — ENCOUNTER SYMPTOMS
VOMITING: 0
SORE THROAT: 0
FATIGUE: 0
WHEEZING: 0
LIGHT-HEADEDNESS: 0
SINUS PAIN: 0
PSYCHIATRIC NEGATIVE: 1
SINUS PRESSURE: 0
DIARRHEA: 0
WEAKNESS: 0
MUSCULOSKELETAL NEGATIVE: 1
EYES NEGATIVE: 1
HEADACHES: 0
NAUSEA: 0
ENDOCRINE NEGATIVE: 1
CHILLS: 0
ABDOMINAL DISTENTION: 0
FEVER: 0
ALLERGIC/IMMUNOLOGIC NEGATIVE: 1
HEMATOLOGIC/LYMPHATIC NEGATIVE: 1
PALPITATIONS: 0
ACTIVITY CHANGE: 0
ABDOMINAL PAIN: 0
RHINORRHEA: 0
COUGH: 0
CONSTIPATION: 0
DIZZINESS: 0
SHORTNESS OF BREATH: 0
NUMBNESS: 0
CHEST TIGHTNESS: 0

## 2024-06-18 NOTE — PROGRESS NOTES
Subjective   Patient ID: Luma Krishnamurthy is a 34 y.o. female who presents for UTI.    Virtual or Telephone Consent    An interactive audio and video telecommunication system which permits real time communications between the patient (at the originating site) and provider (at the distant site) was utilized to provide this telehealth service.   Verbal consent was requested and obtained from Luma Krishnamurthy on this date, 06/19/24 for a telehealth visit.     3 days c/o urgency, frequency, burning with urination.  Denies fever, chills, n/v, diarrhea, chest pain, dyspnea.  UA positive leukocytes, WBCs, bacteria, blood.  Start Bactrim DS 1 tablet twice a day for 3 days.  Culture pending.  Patient is breast-feeding  Educated to stay well-hydrated.  Tylenol 650-1000 mg every 8 hours as needed for pain.  Instructed to call the office if symptoms worsen or do not improve.        The ASCVD Risk score (Sweetie DK, et al., 2019) failed to calculate for the following reasons:    The 2019 ASCVD risk score is only valid for ages 40 to 79    No visits with results within 3 Month(s) from this visit.   Latest known visit with results is:   Admission on 11/29/2023, Discharged on 12/01/2023   Component Date Value Ref Range Status    ABO TYPE 11/29/2023 O   Final    Rh TYPE 11/29/2023 POS   Final    ANTIBODY SCREEN 11/29/2023 NEG   Final    WBC 11/29/2023 7.2  4.4 - 11.3 x10*3/uL Final    nRBC 11/29/2023 0.0  0.0 - 0.0 /100 WBCs Final    RBC 11/29/2023 4.23  4.00 - 5.20 x10*6/uL Final    Hemoglobin 11/29/2023 12.1  12.0 - 16.0 g/dL Final    Hematocrit 11/29/2023 36.9  36.0 - 46.0 % Final    MCV 11/29/2023 87  80 - 100 fL Final    MCH 11/29/2023 28.6  26.0 - 34.0 pg Final    MCHC 11/29/2023 32.8  32.0 - 36.0 g/dL Final    RDW 11/29/2023 14.8 (H)  11.5 - 14.5 % Final    Platelets 11/29/2023 207  150 - 450 x10*3/uL Final       US OB detail fetal anatomy  Interpreted By:  ALBANIA HOUSTON MD  Indication  ========     Fetal Abnormality  Suspected     History  ======     General History  Height  152 cm  Height (ft)  5 ft  Height (in)  0 in  Previous Outcomes    3  Para  2  Children born living ?37w  2  Pregnancies delivered at term (T)  2  Living children (L)  2  Other:  Vaginal Delivery     Maternal Assessment  =================     Height  152 cm  Height (ft)  5 ft  Height (in)  0 in  Weight  67 kg  Weight (lb)  148 lb  Weight gain  0 kg  Weight gain (lb)  0 lb  BMI  28.90 kg/m?  Physical Exam  Initial weight (lb)  148 lb     Pregnancy  =========     Lira pregnancy. Number of fetuses: 1     Dating  ======     LMP on:  2023  Cycle:  Very certain LMP, irregular cycle  GA by LMP  20 w + 2 d  ANGELICA by LMP:  2023  Prior assessment by:  per outside ultrasound  GA by prior assessment  20 w + 5 d  ANGELICA by prior assessment:  2023  Ultrasound examination on:  2023  GA by U/S based upon:  AC, BPD, Femur, HC  GA by U/S  20 w + 5 d  ANGELICA by U/S:  2023  Assigned:  based on stated ANGELICA (per outside ultrasound), selected on   2023  Assigned GA  20 w + 5 d  Assigned ANGELICA:  2023     Fetal Growth Overview  =================     Exam date        GA              BPD (mm)          HC (mm)                AC (mm)               FL  (mm)             HL (mm)            EFW (g)  2023        20w 5d        46.3     21%        179.8    28%          164.9     71%        34.9     52%        32.2     31%        403    68%     Impression  =========     REMOTE READ     A targeted anatomic survey was indicated for Ulcerative colitis (no   meds)  -Fetal biometry is consistent with the stated gestational age  -Detailed anatomic evaluation of the fetal brain/ventricles, face,   heart/outflow tracts and chest  anatomy, abdominal organ specific anatomy, number/length/architecture   of  limbs and detailed evaluation of the umbilical cord and placenta and   other fetal anatomy as  clinically indicated was performed.  -No malformations were  identified on this comprehensive survey within   limitations of sonographic  evaluation at this gestational age.     Due to the history of ulcerative colitis serial assessment of fetal   growth at 30 and 36 weeks may be considered.     Thank you for allowing us to participate in the care of your patient     General Evaluation  ==============     Cardiac activity present.  bpm. Fetal movements: visualized.   Presentation: cephalic  Placenta: Placental site: posterior, No Previa Seen  Umbilical cord: Cord vessels: 3 vessel cord. Insertion site:   placental insertion: normal  Amniotic fluid: Amount of AF: normal amount     Fetal Biometry  ============     Standard  BPD  46.3 mm  20w 0d 21% Hadlock  HC  179.8 mm  20w 3d 28% Hadlock  Cerebellum tr  21.5 mm  20w 5d 56% Honeycutt  Nuchal fold  2.6 mm  AC  164.9 mm  21w 4d 71% Hadlock  Femur  34.9 mm  21w 0d 52% Hadlock  Humerus  32.2 mm   31% Chitty  HC / AC  1.09   12% Hadlock  EFW  403 g  21w 0d 68% Hadlock  EFW (lb)  0 lb  EFW (oz)  14 oz  EFW by:  Hadlock (BPD-HC-AC-FL)  Extended  CM  4.4 mm   25% Nicolaides  Head / Face / Neck  Nasal bone:  present  Extremities / Bony Struc  FL / BPD  0.75   80% Hadlock  FL / HC  0.19   65% Hadlock  FL / AC  0.21   19% Hadlock  Other Structures  FHR  150 bpm     Fetal Anatomy  ===========     Cranium:  Normal  Lateral ventricles:  Normal  Choroid plexus:  Normal  Midline falx:  Normal  Cavum septi pellucidi:  Normal  Cerebellum:  Normal  Cisterna magna:  Normal  Head / Neck  Head size:  Normal  Head shape:  Normal  Rt lateral ventricle:  Normal  Lt lateral ventricle:  Normal  Rt choroid plexus:  Normal  Lt choroid plexus:  Normal  Thalami:  Normal  Cerebellar lobes:  Normal  Vermis:  Normal  Neck:  Normal  Neck:  No neck masses seen  Lips:  Normal  Profile:  Normal  Nose:  Normal  Face  Nasal bone:  present  Maxilla:  Normal  Mandible:  Normal  Orbits:  Normal  4-chamber view:  Normal  RVOT view:  Normal  LVOT view:   Normal  3-vessel view:  Normal  3-vessel-trachea view:  Normal  Heart / Thorax  Situs:  situs solitus (normal)  Aortic arch view:  Normal  Bicaval view:  Normal  Cardiac position:  levocardia (normal)  Cardiac axis:  Normal  Cardiac size:  normal (approx. 1/3 of thoracic area)  Cardiac proportions:  proportioned (normal)  Cardiac rhythm:  regular (normal)  Rt lung:  Normal  Lt lung:  Normal  Rt diaphragm:  Normal  Lt diaphragm:  Normal  Cord insertion:  Normal  Stomach:  Normal  Kidneys:  Normal  Bladder:  Normal  Abdomen  Abdom. wall:  Normal  Stomach:  Stomach size and situs appear normal  Rt kidney:  Normal  Lt kidney:  Normal  Small bowel:  Normal  Large bowel:  Normal  Cervical spine:  Normal  Thoracic spine:  Normal  Lumbar spine:  Normal  Sacral spine:  Normal  Arms:  Normal  Hands:  normal  Legs:  Normal  Feet:  normal  Rt upper arm:  Normal  Rt forearm:  Normal  Rt hand:  Normal  Rt fingers:  Normal  Lt upper arm:  Normal  Lt forearm:  Normal  Lt hand:  Normal  Lt fingers:  Normal  Rt upper leg:  Normal  Rt lower leg:  Normal  Rt foot:  Normal  Rt toes:  Normal  Lt upper leg:  Normal  Lt lower leg:  Normal  Lt foot:  Normal  Lt toes:  Normal  Position of hands:  Normal  Position of feet:  Normal  Fetal sex:  male  Wants to know fetal sex:  yes     Genetic Screen  ============     Age  33 yrs  Echogenic focus:  no  Ventriculomegaly:  no  Nuchal fold:  normal  Echogenic bowel:  no  Pyelectasis:  no  Short femur:  no  Short humerus:  no  Nasal bone:  present  Display risk:  Risk at time of screening     Maternal Structures  ===============     Uterus / Cervix  Uterus:  Visualized  Uterus details:  Normal  Cervix:  Visualized  Cervix details:  Long and closed  Cervical length  38.8 mm  Ovaries / Tubes / Adnexa  Rt ovary:  Visualized  Rt ovary details:  Normal  Rt ovary D1  22.5 mm  Rt ovary D2  27.5 mm  Rt ovary D3  12.4 mm  Rt ovary Vol  4.0 cm?  Lt ovary:  Visualized  Lt ovary details:  Normal  Lt ovary D1   26.2 mm  Lt ovary D2  14.3 mm  Lt ovary D3  23.0 mm  Lt ovary Vol  4.5 cm?     Method  ======     Transabdominal ultrasound examination. View: Sufficient       Review of Systems   Constitutional:  Negative for activity change, chills, fatigue and fever.   HENT:  Negative for congestion, rhinorrhea, sinus pressure, sinus pain and sore throat.    Eyes: Negative.    Respiratory:  Negative for cough, chest tightness, shortness of breath and wheezing.    Cardiovascular:  Negative for chest pain, palpitations and leg swelling.   Gastrointestinal:  Negative for abdominal distention, abdominal pain, constipation, diarrhea, nausea and vomiting.   Endocrine: Negative.    Genitourinary:  Positive for frequency and urgency.        Burning with urination   Musculoskeletal: Negative.    Skin: Negative.    Allergic/Immunologic: Negative.    Neurological:  Negative for dizziness, syncope, weakness, light-headedness, numbness and headaches.   Hematological: Negative.    Psychiatric/Behavioral: Negative.     All other systems reviewed and are negative.      Objective   Visit Vitals  OB Status Recent pregnancy   Smoking Status Former      Physical Exam  Neurological:      Mental Status: She is alert and oriented to person, place, and time.   Psychiatric:         Mood and Affect: Mood normal.         Behavior: Behavior normal.         Thought Content: Thought content normal.         Judgment: Judgment normal.         Assessment/Plan   Luma was seen today for uti.  Diagnoses and all orders for this visit:  Urinary symptom or sign (Primary)  -     Urinalysis with Reflex Culture and Microscopic; Future     # UTI  -start Bactrim DS 1 tablet twice a day for 3 days  -Urine culture pending  -Drink 6-8 glasses of water a day, stay well hydrated  -Call the office if symptoms worsen or do not improve    Follow-up for annual physical and as needed

## 2024-06-19 ENCOUNTER — LAB (OUTPATIENT)
Dept: LAB | Facility: LAB | Age: 34
End: 2024-06-19
Payer: COMMERCIAL

## 2024-06-19 ENCOUNTER — TELEMEDICINE (OUTPATIENT)
Dept: PRIMARY CARE | Facility: CLINIC | Age: 34
End: 2024-06-19
Payer: COMMERCIAL

## 2024-06-19 DIAGNOSIS — R39.9 URINARY SYMPTOM OR SIGN: Primary | ICD-10-CM

## 2024-06-19 DIAGNOSIS — N30.01 ACUTE CYSTITIS WITH HEMATURIA: ICD-10-CM

## 2024-06-19 DIAGNOSIS — R39.9 URINARY SYMPTOM OR SIGN: ICD-10-CM

## 2024-06-19 LAB
APPEARANCE UR: ABNORMAL
BACTERIA #/AREA URNS AUTO: ABNORMAL /HPF
BILIRUB UR STRIP.AUTO-MCNC: NEGATIVE MG/DL
COLOR UR: YELLOW
GLUCOSE UR STRIP.AUTO-MCNC: NEGATIVE MG/DL
KETONES UR STRIP.AUTO-MCNC: NEGATIVE MG/DL
LEUKOCYTE ESTERASE UR QL STRIP.AUTO: ABNORMAL
MUCOUS THREADS #/AREA URNS AUTO: ABNORMAL /LPF
NITRITE UR QL STRIP.AUTO: NEGATIVE
PH UR STRIP.AUTO: 6 [PH]
PROT UR STRIP.AUTO-MCNC: ABNORMAL MG/DL
RBC # UR STRIP.AUTO: ABNORMAL /UL
RBC #/AREA URNS AUTO: >20 /HPF
SP GR UR STRIP.AUTO: 1.01
SQUAMOUS #/AREA URNS AUTO: ABNORMAL /HPF
UROBILINOGEN UR STRIP.AUTO-MCNC: <2 MG/DL
WBC #/AREA URNS AUTO: >50 /HPF

## 2024-06-19 PROCEDURE — 81001 URINALYSIS AUTO W/SCOPE: CPT

## 2024-06-19 PROCEDURE — 99213 OFFICE O/P EST LOW 20 MIN: CPT | Performed by: NURSE PRACTITIONER

## 2024-06-19 PROCEDURE — 87086 URINE CULTURE/COLONY COUNT: CPT

## 2024-06-19 PROCEDURE — 1036F TOBACCO NON-USER: CPT | Performed by: NURSE PRACTITIONER

## 2024-06-19 RX ORDER — SULFAMETHOXAZOLE AND TRIMETHOPRIM 800; 160 MG/1; MG/1
1 TABLET ORAL 2 TIMES DAILY
Qty: 6 TABLET | Refills: 0 | Status: SHIPPED | OUTPATIENT
Start: 2024-06-19

## 2024-06-19 ASSESSMENT — ENCOUNTER SYMPTOMS: FREQUENCY: 1

## 2024-06-20 LAB — HOLD SPECIMEN: NORMAL

## 2024-06-21 LAB — BACTERIA UR CULT: ABNORMAL

## 2024-07-02 ENCOUNTER — TELEMEDICINE (OUTPATIENT)
Dept: PRIMARY CARE | Facility: CLINIC | Age: 34
End: 2024-07-02
Payer: COMMERCIAL

## 2024-07-02 DIAGNOSIS — J01.90 ACUTE NON-RECURRENT SINUSITIS, UNSPECIFIED LOCATION: Primary | ICD-10-CM

## 2024-07-02 DIAGNOSIS — Z20.822 ENCOUNTER BY TELEHEALTH FOR SUSPECTED COVID-19: ICD-10-CM

## 2024-07-02 PROCEDURE — 99213 OFFICE O/P EST LOW 20 MIN: CPT | Performed by: NURSE PRACTITIONER

## 2024-07-02 PROCEDURE — 1036F TOBACCO NON-USER: CPT | Performed by: NURSE PRACTITIONER

## 2024-07-02 RX ORDER — AZITHROMYCIN 250 MG/1
TABLET, FILM COATED ORAL
Qty: 6 TABLET | Refills: 0 | Status: SHIPPED | OUTPATIENT
Start: 2024-07-02

## 2024-07-02 ASSESSMENT — ENCOUNTER SYMPTOMS
SINUS PRESSURE: 1
FEVER: 1
FATIGUE: 1
CHILLS: 1
ARTHRALGIAS: 1

## 2024-07-02 NOTE — PROGRESS NOTES
Subjective   Patient ID: Luma Krishnamurthy is a 34 y.o. female who presents for Sinusitis.    Virtual or Telephone Consent    An interactive audio and video telecommunication system which permits real time communications between the patient (at the originating site) and provider (at the distant site) was utilized to provide this telehealth service.   Verbal consent was requested and obtained from Luma Krishnamurthy on this date, 07/02/24 for a telehealth visit.     C/o 2 days sinus congestion, body aches, fever, general malaise.  Patient is currently breast-feeding.  Recommend patient test at home for COVID.  Yellow sinus drainage, start Z-Chema.  Advised to stay well hydrated, rest. Advised to go to ER for dyspnea, worsening of symptoms or concerning symptoms. Tylenol 650 mg every 8 hours as needed for pain or fever.  Patient may take cetirizine, use saline nasal spray.  Advised to call the office if symptoms do not improve or for symptom worsening.          The ASCVD Risk score (Sweetie NOONAN, et al., 2019) failed to calculate for the following reasons:    The 2019 ASCVD risk score is only valid for ages 40 to 79    Lab on 06/19/2024   Component Date Value Ref Range Status    Color, Urine 06/19/2024 Yellow  Straw, Yellow Final    Appearance, Urine 06/19/2024 Hazy (N)  Clear Final    Specific Gravity, Urine 06/19/2024 1.011  1.005 - 1.035 Final    pH, Urine 06/19/2024 6.0  5.0, 5.5, 6.0, 6.5, 7.0, 7.5, 8.0 Final    Protein, Urine 06/19/2024 30 (1+) (N)  NEGATIVE mg/dL Final    Glucose, Urine 06/19/2024 NEGATIVE  NEGATIVE mg/dL Final    Blood, Urine 06/19/2024 LARGE (3+) (A)  NEGATIVE Final    Ketones, Urine 06/19/2024 NEGATIVE  NEGATIVE mg/dL Final    Bilirubin, Urine 06/19/2024 NEGATIVE  NEGATIVE Final    Urobilinogen, Urine 06/19/2024 <2.0  <2.0 mg/dL Final    Nitrite, Urine 06/19/2024 NEGATIVE  NEGATIVE Final    Leukocyte Esterase, Urine 06/19/2024 MODERATE (2+) (A)  NEGATIVE Final    Extra Tube 06/19/2024 Hold for  add-ons.   Final    Auto resulted.    WBC, Urine 2024 >50 (A)  1-5, NONE /HPF Final    RBC, Urine 2024 >20 (A)  NONE, 1-2, 3-5 /HPF Final    Squamous Epithelial Cells, Urine 2024 10-25 (FEW)  Reference range not established. /HPF Final    Bacteria, Urine 2024 1+ (A)  NONE SEEN /HPF Final    Mucus, Urine 2024 1+  Reference range not established. /LPF Final    Urine Culture 2024 >100,000 Staphylococcus saprophyticus (A)   Final       US OB detail fetal anatomy  Interpreted By:  ALBANIA HOUSTON MD  Indication  ========     Fetal Abnormality Suspected     History  ======     General History  Height  152 cm  Height (ft)  5 ft  Height (in)  0 in  Previous Outcomes    3  Para  2  Children born living ?37w  2  Pregnancies delivered at term (T)  2  Living children (L)  2  Other:  Vaginal Delivery     Maternal Assessment  =================     Height  152 cm  Height (ft)  5 ft  Height (in)  0 in  Weight  67 kg  Weight (lb)  148 lb  Weight gain  0 kg  Weight gain (lb)  0 lb  BMI  28.90 kg/m?  Physical Exam  Initial weight (lb)  148 lb     Pregnancy  =========     Lira pregnancy. Number of fetuses: 1     Dating  ======     LMP on:  2023  Cycle:  Very certain LMP, irregular cycle  GA by LMP  20 w + 2 d  ANGELICA by LMP:  2023  Prior assessment by:  per outside ultrasound  GA by prior assessment  20 w + 5 d  ANGELICA by prior assessment:  2023  Ultrasound examination on:  2023  GA by U/S based upon:  AC, BPD, Femur, HC  GA by U/S  20 w + 5 d  ANGELICA by U/S:  2023  Assigned:  based on stated ANGELICA (per outside ultrasound), selected on   2023  Assigned GA  20 w + 5 d  Assigned ANGELICA:  2023     Fetal Growth Overview  =================     Exam date        GA              BPD (mm)          HC (mm)                AC (mm)               FL  (mm)             HL (mm)            EFW (g)  2023        20w 5d        46.3     21%        179.8    28%          164.9      71%        34.9     52%        32.2     31%        403    68%     Impression  =========     REMOTE READ     A targeted anatomic survey was indicated for Ulcerative colitis (no   meds)  -Fetal biometry is consistent with the stated gestational age  -Detailed anatomic evaluation of the fetal brain/ventricles, face,   heart/outflow tracts and chest  anatomy, abdominal organ specific anatomy, number/length/architecture   of  limbs and detailed evaluation of the umbilical cord and placenta and   other fetal anatomy as  clinically indicated was performed.  -No malformations were identified on this comprehensive survey within   limitations of sonographic  evaluation at this gestational age.     Due to the history of ulcerative colitis serial assessment of fetal   growth at 30 and 36 weeks may be considered.     Thank you for allowing us to participate in the care of your patient     General Evaluation  ==============     Cardiac activity present.  bpm. Fetal movements: visualized.   Presentation: cephalic  Placenta: Placental site: posterior, No Previa Seen  Umbilical cord: Cord vessels: 3 vessel cord. Insertion site:   placental insertion: normal  Amniotic fluid: Amount of AF: normal amount     Fetal Biometry  ============     Standard  BPD  46.3 mm  20w 0d 21% Hadlock  HC  179.8 mm  20w 3d 28% Hadlock  Cerebellum tr  21.5 mm  20w 5d 56% Honeycutt  Nuchal fold  2.6 mm  AC  164.9 mm  21w 4d 71% Hadlock  Femur  34.9 mm  21w 0d 52% Hadlock  Humerus  32.2 mm   31% Chitty  HC / AC  1.09   12% Hadlock  EFW  403 g  21w 0d 68% Hadlock  EFW (lb)  0 lb  EFW (oz)  14 oz  EFW by:  Hadlock (BPD-HC-AC-FL)  Extended  CM  4.4 mm   25% Nicolaides  Head / Face / Neck  Nasal bone:  present  Extremities / Bony Struc  FL / BPD  0.75   80% Hadlock  FL / HC  0.19   65% Hadlock  FL / AC  0.21   19% Hadlock  Other Structures  FHR  150 bpm     Fetal Anatomy  ===========     Cranium:  Normal  Lateral ventricles:  Normal  Choroid plexus:   Normal  Midline falx:  Normal  Cavum septi pellucidi:  Normal  Cerebellum:  Normal  Cisterna magna:  Normal  Head / Neck  Head size:  Normal  Head shape:  Normal  Rt lateral ventricle:  Normal  Lt lateral ventricle:  Normal  Rt choroid plexus:  Normal  Lt choroid plexus:  Normal  Thalami:  Normal  Cerebellar lobes:  Normal  Vermis:  Normal  Neck:  Normal  Neck:  No neck masses seen  Lips:  Normal  Profile:  Normal  Nose:  Normal  Face  Nasal bone:  present  Maxilla:  Normal  Mandible:  Normal  Orbits:  Normal  4-chamber view:  Normal  RVOT view:  Normal  LVOT view:  Normal  3-vessel view:  Normal  3-vessel-trachea view:  Normal  Heart / Thorax  Situs:  situs solitus (normal)  Aortic arch view:  Normal  Bicaval view:  Normal  Cardiac position:  levocardia (normal)  Cardiac axis:  Normal  Cardiac size:  normal (approx. 1/3 of thoracic area)  Cardiac proportions:  proportioned (normal)  Cardiac rhythm:  regular (normal)  Rt lung:  Normal  Lt lung:  Normal  Rt diaphragm:  Normal  Lt diaphragm:  Normal  Cord insertion:  Normal  Stomach:  Normal  Kidneys:  Normal  Bladder:  Normal  Abdomen  Abdom. wall:  Normal  Stomach:  Stomach size and situs appear normal  Rt kidney:  Normal  Lt kidney:  Normal  Small bowel:  Normal  Large bowel:  Normal  Cervical spine:  Normal  Thoracic spine:  Normal  Lumbar spine:  Normal  Sacral spine:  Normal  Arms:  Normal  Hands:  normal  Legs:  Normal  Feet:  normal  Rt upper arm:  Normal  Rt forearm:  Normal  Rt hand:  Normal  Rt fingers:  Normal  Lt upper arm:  Normal  Lt forearm:  Normal  Lt hand:  Normal  Lt fingers:  Normal  Rt upper leg:  Normal  Rt lower leg:  Normal  Rt foot:  Normal  Rt toes:  Normal  Lt upper leg:  Normal  Lt lower leg:  Normal  Lt foot:  Normal  Lt toes:  Normal  Position of hands:  Normal  Position of feet:  Normal  Fetal sex:  male  Wants to know fetal sex:  yes     Genetic Screen  ============     Age  33 yrs  Echogenic focus:  no  Ventriculomegaly:  no  Nuchal  fold:  normal  Echogenic bowel:  no  Pyelectasis:  no  Short femur:  no  Short humerus:  no  Nasal bone:  present  Display risk:  Risk at time of screening     Maternal Structures  ===============     Uterus / Cervix  Uterus:  Visualized  Uterus details:  Normal  Cervix:  Visualized  Cervix details:  Long and closed  Cervical length  38.8 mm  Ovaries / Tubes / Adnexa  Rt ovary:  Visualized  Rt ovary details:  Normal  Rt ovary D1  22.5 mm  Rt ovary D2  27.5 mm  Rt ovary D3  12.4 mm  Rt ovary Vol  4.0 cm?  Lt ovary:  Visualized  Lt ovary details:  Normal  Lt ovary D1  26.2 mm  Lt ovary D2  14.3 mm  Lt ovary D3  23.0 mm  Lt ovary Vol  4.5 cm?     Method  ======     Transabdominal ultrasound examination. View: Sufficient       Review of Systems   Constitutional:  Positive for chills, fatigue and fever.   HENT:  Positive for congestion and sinus pressure.    Musculoskeletal:  Positive for arthralgias.   All other systems reviewed and are negative.      Objective   Visit Vitals  OB Status Recent pregnancy   Smoking Status Former      Physical Exam  Constitutional:       Appearance: She is ill-appearing.   Neurological:      Mental Status: She is alert and oriented to person, place, and time.   Psychiatric:         Mood and Affect: Mood normal.         Behavior: Behavior normal.         Thought Content: Thought content normal.         Judgment: Judgment normal.         Assessment/Plan   Luma was seen today for sinusitis.  Diagnoses and all orders for this visit:  Acute non-recurrent sinusitis, unspecified location (Primary)  -     azithromycin (Zithromax) 250 mg tablet; TAKE TWO TABLETS ON DAY ONE AND ONE TABLET DAILY FOR FOUR DAYS AFTER  Encounter by telehealth for suspected COVID-19    # Sinusitis  -Start Z-Chema  -Saline nasal spray as needed  -Tylenol 650 mg every 8 hours as needed for pain  -May take Zyrtec, Benadryl as needed  -Drink plenty of fluids  -Get plenty of rest  -Call the office if no improvement or  worsens  # Suspected COVID  -Recommend test at home for COVID    Follow-up as needed

## 2024-07-31 ASSESSMENT — ENCOUNTER SYMPTOMS
CHEST TIGHTNESS: 0
SINUS PRESSURE: 0
FATIGUE: 0
VOMITING: 0
LIGHT-HEADEDNESS: 0
PSYCHIATRIC NEGATIVE: 1
SORE THROAT: 0
SINUS PAIN: 0
NUMBNESS: 0
DIZZINESS: 0
ABDOMINAL PAIN: 0
ENDOCRINE NEGATIVE: 1
WHEEZING: 0
ABDOMINAL DISTENTION: 0
FEVER: 0
ALLERGIC/IMMUNOLOGIC NEGATIVE: 1
ACTIVITY CHANGE: 0
SHORTNESS OF BREATH: 0
HEMATOLOGIC/LYMPHATIC NEGATIVE: 1
EYES NEGATIVE: 1
RHINORRHEA: 0
PALPITATIONS: 0
HEADACHES: 0
WEAKNESS: 0
CHILLS: 0
DIARRHEA: 0
NAUSEA: 0
MUSCULOSKELETAL NEGATIVE: 1
CONSTIPATION: 0
COUGH: 0

## 2024-07-31 NOTE — PROGRESS NOTES
Subjective   Patient ID: Luma Krishnamurthy is a 34 y.o. female who presents for Rash and Alopecia.    Virtual or Telephone Consent    An interactive audio and video telecommunication system which permits real time communications between the patient (at the originating site) and provider (at the distant site) was utilized to provide this telehealth service.   Verbal consent was requested and obtained from Luma Krishnamurthy on this date, 08/01/24 for a telehealth visit.     Originally scheduled in office, patient requested change to virtual due to migraine.  She has not been getting sleep, her infant is teething and has kept her up a lot at night.  C/o itching rash on back.  According to patient looks like poison ivy, she does get poison ivy easily.  She has been outside but not really doing anything that she thinks she could have come in contact with the poison ivy.  Discussed spread of poison ivy by oils from plant.  On camera, able to see to lines of red rash.  Dermatitis, start prednisone 40 mg daily for 5 days.  Instructed to call the office if symptoms worsen or do not improve.  She continues to lose hair.  She is due for complete labs, check TSH.  Ulcerative colitis, referral to GI        The ASCVD Risk score (Sanborn DK, et al., 2019) failed to calculate for the following reasons:    The 2019 ASCVD risk score is only valid for ages 40 to 79    Lab on 06/19/2024   Component Date Value Ref Range Status    Color, Urine 06/19/2024 Yellow  Straw, Yellow Final    Appearance, Urine 06/19/2024 Hazy (N)  Clear Final    Specific Gravity, Urine 06/19/2024 1.011  1.005 - 1.035 Final    pH, Urine 06/19/2024 6.0  5.0, 5.5, 6.0, 6.5, 7.0, 7.5, 8.0 Final    Protein, Urine 06/19/2024 30 (1+) (N)  NEGATIVE mg/dL Final    Glucose, Urine 06/19/2024 NEGATIVE  NEGATIVE mg/dL Final    Blood, Urine 06/19/2024 LARGE (3+) (A)  NEGATIVE Final    Ketones, Urine 06/19/2024 NEGATIVE  NEGATIVE mg/dL Final    Bilirubin, Urine 06/19/2024 NEGATIVE   NEGATIVE Final    Urobilinogen, Urine 2024 <2.0  <2.0 mg/dL Final    Nitrite, Urine 2024 NEGATIVE  NEGATIVE Final    Leukocyte Esterase, Urine 2024 MODERATE (2+) (A)  NEGATIVE Final    Extra Tube 2024 Hold for add-ons.   Final    Auto resulted.    WBC, Urine 2024 >50 (A)  1-5, NONE /HPF Final    RBC, Urine 2024 >20 (A)  NONE, 1-2, 3-5 /HPF Final    Squamous Epithelial Cells, Urine 2024 10-25 (FEW)  Reference range not established. /HPF Final    Bacteria, Urine 2024 1+ (A)  NONE SEEN /HPF Final    Mucus, Urine 2024 1+  Reference range not established. /LPF Final    Urine Culture 2024 >100,000 Staphylococcus saprophyticus (A)   Final       US OB detail fetal anatomy  Interpreted By:  ALBANIA HOUSTON MD  Indication  ========     Fetal Abnormality Suspected     History  ======     General History  Height  152 cm  Height (ft)  5 ft  Height (in)  0 in  Previous Outcomes    3  Para  2  Children born living ?37w  2  Pregnancies delivered at term (T)  2  Living children (L)  2  Other:  Vaginal Delivery     Maternal Assessment  =================     Height  152 cm  Height (ft)  5 ft  Height (in)  0 in  Weight  67 kg  Weight (lb)  148 lb  Weight gain  0 kg  Weight gain (lb)  0 lb  BMI  28.90 kg/m?  Physical Exam  Initial weight (lb)  148 lb     Pregnancy  =========     Lira pregnancy. Number of fetuses: 1     Dating  ======     LMP on:  2023  Cycle:  Very certain LMP, irregular cycle  GA by LMP  20 w + 2 d  ANGELICA by LMP:  2023  Prior assessment by:  per outside ultrasound  GA by prior assessment  20 w + 5 d  ANGELICA by prior assessment:  2023  Ultrasound examination on:  2023  GA by U/S based upon:  AC, BPD, Femur, HC  GA by U/S  20 w + 5 d  ANGELICA by U/S:  2023  Assigned:  based on stated ANGELICA (per outside ultrasound), selected on   2023  Assigned GA  20 w + 5 d  Assigned ANGELICA:  2023     Fetal Growth  Overview  =================     Exam date        GA              BPD (mm)          HC (mm)                AC (mm)               FL  (mm)             HL (mm)            EFW (g)  07/13/2023        20w 5d        46.3     21%        179.8    28%          164.9     71%        34.9     52%        32.2     31%        403    68%     Impression  =========     REMOTE READ     A targeted anatomic survey was indicated for Ulcerative colitis (no   meds)  -Fetal biometry is consistent with the stated gestational age  -Detailed anatomic evaluation of the fetal brain/ventricles, face,   heart/outflow tracts and chest  anatomy, abdominal organ specific anatomy, number/length/architecture   of  limbs and detailed evaluation of the umbilical cord and placenta and   other fetal anatomy as  clinically indicated was performed.  -No malformations were identified on this comprehensive survey within   limitations of sonographic  evaluation at this gestational age.     Due to the history of ulcerative colitis serial assessment of fetal   growth at 30 and 36 weeks may be considered.     Thank you for allowing us to participate in the care of your patient     General Evaluation  ==============     Cardiac activity present.  bpm. Fetal movements: visualized.   Presentation: cephalic  Placenta: Placental site: posterior, No Previa Seen  Umbilical cord: Cord vessels: 3 vessel cord. Insertion site:   placental insertion: normal  Amniotic fluid: Amount of AF: normal amount     Fetal Biometry  ============     Standard  BPD  46.3 mm  20w 0d 21% Hadlock  HC  179.8 mm  20w 3d 28% Hadlock  Cerebellum tr  21.5 mm  20w 5d 56% Honeycutt  Nuchal fold  2.6 mm  AC  164.9 mm  21w 4d 71% Hadlock  Femur  34.9 mm  21w 0d 52% Hadlock  Humerus  32.2 mm   31% Chitty  HC / AC  1.09   12% Hadlock  EFW  403 g  21w 0d 68% Hadlock  EFW (lb)  0 lb  EFW (oz)  14 oz  EFW by:  Hadlock (BPD-HC-AC-FL)  Extended  CM  4.4 mm   25% Nicolaides  Head / Face / Neck  Nasal bone:   present  Extremities / Bony Struc  FL / BPD  0.75   80% Hadlock  FL / HC  0.19   65% Hadlock  FL / AC  0.21   19% Hadlock  Other Structures  FHR  150 bpm     Fetal Anatomy  ===========     Cranium:  Normal  Lateral ventricles:  Normal  Choroid plexus:  Normal  Midline falx:  Normal  Cavum septi pellucidi:  Normal  Cerebellum:  Normal  Cisterna magna:  Normal  Head / Neck  Head size:  Normal  Head shape:  Normal  Rt lateral ventricle:  Normal  Lt lateral ventricle:  Normal  Rt choroid plexus:  Normal  Lt choroid plexus:  Normal  Thalami:  Normal  Cerebellar lobes:  Normal  Vermis:  Normal  Neck:  Normal  Neck:  No neck masses seen  Lips:  Normal  Profile:  Normal  Nose:  Normal  Face  Nasal bone:  present  Maxilla:  Normal  Mandible:  Normal  Orbits:  Normal  4-chamber view:  Normal  RVOT view:  Normal  LVOT view:  Normal  3-vessel view:  Normal  3-vessel-trachea view:  Normal  Heart / Thorax  Situs:  situs solitus (normal)  Aortic arch view:  Normal  Bicaval view:  Normal  Cardiac position:  levocardia (normal)  Cardiac axis:  Normal  Cardiac size:  normal (approx. 1/3 of thoracic area)  Cardiac proportions:  proportioned (normal)  Cardiac rhythm:  regular (normal)  Rt lung:  Normal  Lt lung:  Normal  Rt diaphragm:  Normal  Lt diaphragm:  Normal  Cord insertion:  Normal  Stomach:  Normal  Kidneys:  Normal  Bladder:  Normal  Abdomen  Abdom. wall:  Normal  Stomach:  Stomach size and situs appear normal  Rt kidney:  Normal  Lt kidney:  Normal  Small bowel:  Normal  Large bowel:  Normal  Cervical spine:  Normal  Thoracic spine:  Normal  Lumbar spine:  Normal  Sacral spine:  Normal  Arms:  Normal  Hands:  normal  Legs:  Normal  Feet:  normal  Rt upper arm:  Normal  Rt forearm:  Normal  Rt hand:  Normal  Rt fingers:  Normal  Lt upper arm:  Normal  Lt forearm:  Normal  Lt hand:  Normal  Lt fingers:  Normal  Rt upper leg:  Normal  Rt lower leg:  Normal  Rt foot:  Normal  Rt toes:  Normal  Lt upper leg:  Normal  Lt lower  leg:  Normal  Lt foot:  Normal  Lt toes:  Normal  Position of hands:  Normal  Position of feet:  Normal  Fetal sex:  male  Wants to know fetal sex:  yes     Genetic Screen  ============     Age  33 yrs  Echogenic focus:  no  Ventriculomegaly:  no  Nuchal fold:  normal  Echogenic bowel:  no  Pyelectasis:  no  Short femur:  no  Short humerus:  no  Nasal bone:  present  Display risk:  Risk at time of screening     Maternal Structures  ===============     Uterus / Cervix  Uterus:  Visualized  Uterus details:  Normal  Cervix:  Visualized  Cervix details:  Long and closed  Cervical length  38.8 mm  Ovaries / Tubes / Adnexa  Rt ovary:  Visualized  Rt ovary details:  Normal  Rt ovary D1  22.5 mm  Rt ovary D2  27.5 mm  Rt ovary D3  12.4 mm  Rt ovary Vol  4.0 cm?  Lt ovary:  Visualized  Lt ovary details:  Normal  Lt ovary D1  26.2 mm  Lt ovary D2  14.3 mm  Lt ovary D3  23.0 mm  Lt ovary Vol  4.5 cm?     Method  ======     Transabdominal ultrasound examination. View: Sufficient       Review of Systems   Constitutional:  Negative for activity change, chills, fatigue and fever.   HENT:  Negative for congestion, rhinorrhea, sinus pressure, sinus pain and sore throat.    Eyes: Negative.    Respiratory:  Negative for cough, chest tightness, shortness of breath and wheezing.    Cardiovascular:  Negative for chest pain, palpitations and leg swelling.   Gastrointestinal:  Negative for abdominal distention, abdominal pain, constipation, diarrhea, nausea and vomiting.   Endocrine: Negative.    Musculoskeletal: Negative.    Skin:  Positive for rash.        Hair loss   Allergic/Immunologic: Negative.    Neurological:  Negative for dizziness, syncope, weakness, light-headedness, numbness and headaches.   Hematological: Negative.    Psychiatric/Behavioral: Negative.     All other systems reviewed and are negative.      Objective   Visit Vitals  OB Status Recent pregnancy   Smoking Status Former      Physical Exam  Skin:     Findings: Rash  (L lumbar area) present.   Neurological:      Mental Status: She is alert.   Psychiatric:         Mood and Affect: Mood normal.         Behavior: Behavior normal.         Thought Content: Thought content normal.         Judgment: Judgment normal.         Assessment/Plan   Luma was seen today for rash and alopecia.  Diagnoses and all orders for this visit:  Rash (Primary)  -     predniSONE (Deltasone) 20 mg tablet; Take 2 tablets (40 mg) by mouth once daily for 5 days.  Vitamin D deficiency  -     Vitamin D 25-Hydroxy,Total (for eval of Vitamin D levels); Future  Healthcare maintenance  -     CBC and Auto Differential; Future  -     Comprehensive Metabolic Panel; Future  -     Lipid Panel; Future  -     TSH with reflex to Free T4 if abnormal; Future  -     Vitamin B12; Future  Ulcerative colitis without complications, unspecified location (Multi)  -     Referral to Gastroenterology; Future  Hair loss     Follow-up as needed with me until 10/11/2024 and follow up after with new provider    Patient advised I will be leaving [facility], my last day is October 11, 2024. Discussed options for new provider.

## 2024-08-01 ENCOUNTER — TELEMEDICINE (OUTPATIENT)
Dept: PRIMARY CARE | Facility: CLINIC | Age: 34
End: 2024-08-01
Payer: COMMERCIAL

## 2024-08-01 DIAGNOSIS — R21 RASH: Primary | ICD-10-CM

## 2024-08-01 DIAGNOSIS — K51.90 ULCERATIVE COLITIS WITHOUT COMPLICATIONS, UNSPECIFIED LOCATION (MULTI): ICD-10-CM

## 2024-08-01 DIAGNOSIS — L65.9 HAIR LOSS: ICD-10-CM

## 2024-08-01 DIAGNOSIS — Z00.00 HEALTHCARE MAINTENANCE: ICD-10-CM

## 2024-08-01 DIAGNOSIS — E55.9 VITAMIN D DEFICIENCY: ICD-10-CM

## 2024-08-01 PROCEDURE — 99214 OFFICE O/P EST MOD 30 MIN: CPT | Performed by: NURSE PRACTITIONER

## 2024-08-01 PROCEDURE — 1036F TOBACCO NON-USER: CPT | Performed by: NURSE PRACTITIONER

## 2024-08-01 RX ORDER — PREDNISONE 20 MG/1
40 TABLET ORAL DAILY
Qty: 10 TABLET | Refills: 0 | Status: SHIPPED | OUTPATIENT
Start: 2024-08-01 | End: 2024-08-06

## 2024-08-01 ASSESSMENT — ENCOUNTER SYMPTOMS: ROS SKIN COMMENTS: HAIR LOSS

## 2024-09-12 ENCOUNTER — LAB (OUTPATIENT)
Dept: LAB | Facility: LAB | Age: 34
End: 2024-09-12
Payer: COMMERCIAL

## 2024-09-12 ENCOUNTER — OFFICE VISIT (OUTPATIENT)
Dept: PRIMARY CARE | Facility: CLINIC | Age: 34
End: 2024-09-12
Payer: COMMERCIAL

## 2024-09-12 VITALS
TEMPERATURE: 97.2 F | OXYGEN SATURATION: 98 % | HEART RATE: 80 BPM | WEIGHT: 141 LBS | SYSTOLIC BLOOD PRESSURE: 124 MMHG | DIASTOLIC BLOOD PRESSURE: 72 MMHG | BODY MASS INDEX: 27.54 KG/M2

## 2024-09-12 DIAGNOSIS — E55.9 VITAMIN D DEFICIENCY: ICD-10-CM

## 2024-09-12 DIAGNOSIS — R39.9 URINARY SYMPTOM OR SIGN: Primary | ICD-10-CM

## 2024-09-12 DIAGNOSIS — Z00.00 HEALTHCARE MAINTENANCE: ICD-10-CM

## 2024-09-12 DIAGNOSIS — R01.1 HEART MURMUR: ICD-10-CM

## 2024-09-12 DIAGNOSIS — R39.9 URINARY SYMPTOM OR SIGN: ICD-10-CM

## 2024-09-12 LAB
25(OH)D3 SERPL-MCNC: 29 NG/ML (ref 30–100)
ALBUMIN SERPL BCP-MCNC: 4.5 G/DL (ref 3.4–5)
ALP SERPL-CCNC: 102 U/L (ref 33–110)
ALT SERPL W P-5'-P-CCNC: 13 U/L (ref 7–45)
ANION GAP SERPL CALC-SCNC: 12 MMOL/L (ref 10–20)
APPEARANCE UR: CLEAR
AST SERPL W P-5'-P-CCNC: 16 U/L (ref 9–39)
B-HCG SERPL-ACNC: <2 MIU/ML
BASOPHILS # BLD AUTO: 0.02 X10*3/UL (ref 0–0.1)
BASOPHILS NFR BLD AUTO: 0.3 %
BILIRUB SERPL-MCNC: 0.4 MG/DL (ref 0–1.2)
BILIRUB UR STRIP.AUTO-MCNC: NEGATIVE MG/DL
BUN SERPL-MCNC: 9 MG/DL (ref 6–23)
CALCIUM SERPL-MCNC: 9.2 MG/DL (ref 8.6–10.3)
CHLORIDE SERPL-SCNC: 102 MMOL/L (ref 98–107)
CHOLEST SERPL-MCNC: 194 MG/DL (ref 0–199)
CHOLESTEROL/HDL RATIO: 3.4
CO2 SERPL-SCNC: 28 MMOL/L (ref 21–32)
COLOR UR: COLORLESS
CREAT SERPL-MCNC: 0.68 MG/DL (ref 0.5–1.05)
EGFRCR SERPLBLD CKD-EPI 2021: >90 ML/MIN/1.73M*2
EOSINOPHIL # BLD AUTO: 0.16 X10*3/UL (ref 0–0.7)
EOSINOPHIL NFR BLD AUTO: 2.5 %
ERYTHROCYTE [DISTWIDTH] IN BLOOD BY AUTOMATED COUNT: 13.1 % (ref 11.5–14.5)
EST. AVERAGE GLUCOSE BLD GHB EST-MCNC: 97 MG/DL
GLUCOSE SERPL-MCNC: 78 MG/DL (ref 74–99)
GLUCOSE UR STRIP.AUTO-MCNC: NORMAL MG/DL
HBA1C MFR BLD: 5 %
HCT VFR BLD AUTO: 40.7 % (ref 36–46)
HDLC SERPL-MCNC: 57 MG/DL
HGB BLD-MCNC: 13.5 G/DL (ref 12–16)
HOLD SPECIMEN: NORMAL
IMM GRANULOCYTES # BLD AUTO: 0.01 X10*3/UL (ref 0–0.7)
IMM GRANULOCYTES NFR BLD AUTO: 0.2 % (ref 0–0.9)
KETONES UR STRIP.AUTO-MCNC: NEGATIVE MG/DL
LDLC SERPL CALC-MCNC: 117 MG/DL
LEUKOCYTE ESTERASE UR QL STRIP.AUTO: NEGATIVE
LYMPHOCYTES # BLD AUTO: 1.25 X10*3/UL (ref 1.2–4.8)
LYMPHOCYTES NFR BLD AUTO: 19.5 %
MCH RBC QN AUTO: 29.3 PG (ref 26–34)
MCHC RBC AUTO-ENTMCNC: 33.2 G/DL (ref 32–36)
MCV RBC AUTO: 89 FL (ref 80–100)
MONOCYTES # BLD AUTO: 0.41 X10*3/UL (ref 0.1–1)
MONOCYTES NFR BLD AUTO: 6.4 %
NEUTROPHILS # BLD AUTO: 4.55 X10*3/UL (ref 1.2–7.7)
NEUTROPHILS NFR BLD AUTO: 71.1 %
NITRITE UR QL STRIP.AUTO: NEGATIVE
NON HDL CHOLESTEROL: 137 MG/DL (ref 0–149)
NRBC BLD-RTO: 0 /100 WBCS (ref 0–0)
PH UR STRIP.AUTO: 6.5 [PH]
PLATELET # BLD AUTO: 256 X10*3/UL (ref 150–450)
POTASSIUM SERPL-SCNC: 3.7 MMOL/L (ref 3.5–5.3)
PROT SERPL-MCNC: 7.6 G/DL (ref 6.4–8.2)
PROT UR STRIP.AUTO-MCNC: NEGATIVE MG/DL
RBC # BLD AUTO: 4.6 X10*6/UL (ref 4–5.2)
RBC # UR STRIP.AUTO: NEGATIVE /UL
SODIUM SERPL-SCNC: 138 MMOL/L (ref 136–145)
SP GR UR STRIP.AUTO: 1
TRIGL SERPL-MCNC: 102 MG/DL (ref 0–149)
TSH SERPL-ACNC: 1.49 MIU/L (ref 0.44–3.98)
UROBILINOGEN UR STRIP.AUTO-MCNC: NORMAL MG/DL
VIT B12 SERPL-MCNC: 243 PG/ML (ref 211–911)
VLDL: 20 MG/DL (ref 0–40)
WBC # BLD AUTO: 6.4 X10*3/UL (ref 4.4–11.3)

## 2024-09-12 PROCEDURE — 36415 COLL VENOUS BLD VENIPUNCTURE: CPT

## 2024-09-12 PROCEDURE — 82607 VITAMIN B-12: CPT

## 2024-09-12 PROCEDURE — 83036 HEMOGLOBIN GLYCOSYLATED A1C: CPT

## 2024-09-12 PROCEDURE — 82306 VITAMIN D 25 HYDROXY: CPT

## 2024-09-12 ASSESSMENT — ENCOUNTER SYMPTOMS
EYES NEGATIVE: 1
CHILLS: 0
SINUS PRESSURE: 0
HEADACHES: 0
NAUSEA: 0
MUSCULOSKELETAL NEGATIVE: 1
SORE THROAT: 0
RHINORRHEA: 0
ENDOCRINE NEGATIVE: 1
HEMATOLOGIC/LYMPHATIC NEGATIVE: 1
WEAKNESS: 0
DIZZINESS: 0
LIGHT-HEADEDNESS: 0
WHEEZING: 0
DIARRHEA: 0
ABDOMINAL PAIN: 0
SHORTNESS OF BREATH: 0
NUMBNESS: 0
FEVER: 0
PALPITATIONS: 0
SINUS PAIN: 0
CHEST TIGHTNESS: 0
ABDOMINAL DISTENTION: 0
PSYCHIATRIC NEGATIVE: 1
FATIGUE: 0
ACTIVITY CHANGE: 0
VOMITING: 0
COUGH: 0
ALLERGIC/IMMUNOLOGIC NEGATIVE: 1
CONSTIPATION: 0

## 2024-09-12 NOTE — PROGRESS NOTES
Subjective   Patient ID: Luma Krishnamurthy is a 34 y.o. female who presents for Heart Murmur (ECG), mole (Right arm bump), and UTI (Would like UA done, make sure not UTI or pregnant).    1 month vertigo, she is staying well-hydrated.  Some lower abdominal bloating/pressure.  Will check UA and labs.  History of murmur, no murmur heard.  Echo 2023, EF 55-60%.  Echocardiogram not indicated at this time.  New skin lesion on right forearm and right thigh, recommend referral to dermatology          The ASCVD Risk score (Sweetie NOONAN, et al., 2019) failed to calculate for the following reasons:    The 2019 ASCVD risk score is only valid for ages 40 to 79    Lab on 09/12/2024   Component Date Value Ref Range Status    Color, Urine 09/12/2024 Colorless (N)  Light-Yellow, Yellow, Dark-Yellow Final    Appearance, Urine 09/12/2024 Clear  Clear Final    Specific Gravity, Urine 09/12/2024 1.002 (N)  1.005 - 1.035 Final    pH, Urine 09/12/2024 6.5  5.0, 5.5, 6.0, 6.5, 7.0, 7.5, 8.0 Final    Protein, Urine 09/12/2024 NEGATIVE  NEGATIVE, 10 (TRACE), 20 (TRACE) mg/dL Final    Glucose, Urine 09/12/2024 Normal  Normal mg/dL Final    Blood, Urine 09/12/2024 NEGATIVE  NEGATIVE Final    Ketones, Urine 09/12/2024 NEGATIVE  NEGATIVE mg/dL Final    Bilirubin, Urine 09/12/2024 NEGATIVE  NEGATIVE Final    Urobilinogen, Urine 09/12/2024 Normal  Normal mg/dL Final    Nitrite, Urine 09/12/2024 NEGATIVE  NEGATIVE Final    Leukocyte Esterase, Urine 09/12/2024 NEGATIVE  NEGATIVE Final   Lab on 06/19/2024   Component Date Value Ref Range Status    Color, Urine 06/19/2024 Yellow  Straw, Yellow Final    Appearance, Urine 06/19/2024 Hazy (N)  Clear Final    Specific Gravity, Urine 06/19/2024 1.011  1.005 - 1.035 Final    pH, Urine 06/19/2024 6.0  5.0, 5.5, 6.0, 6.5, 7.0, 7.5, 8.0 Final    Protein, Urine 06/19/2024 30 (1+) (N)  NEGATIVE mg/dL Final    Glucose, Urine 06/19/2024 NEGATIVE  NEGATIVE mg/dL Final    Blood, Urine 06/19/2024 LARGE (3+) (A)  NEGATIVE  Final    Ketones, Urine 06/19/2024 NEGATIVE  NEGATIVE mg/dL Final    Bilirubin, Urine 06/19/2024 NEGATIVE  NEGATIVE Final    Urobilinogen, Urine 06/19/2024 <2.0  <2.0 mg/dL Final    Nitrite, Urine 06/19/2024 NEGATIVE  NEGATIVE Final    Leukocyte Esterase, Urine 06/19/2024 MODERATE (2+) (A)  NEGATIVE Final    Extra Tube 06/19/2024 Hold for add-ons.   Final    Auto resulted.    WBC, Urine 06/19/2024 >50 (A)  1-5, NONE /HPF Final    RBC, Urine 06/19/2024 >20 (A)  NONE, 1-2, 3-5 /HPF Final    Squamous Epithelial Cells, Urine 06/19/2024 10-25 (FEW)  Reference range not established. /HPF Final    Bacteria, Urine 06/19/2024 1+ (A)  NONE SEEN /HPF Final    Mucus, Urine 06/19/2024 1+  Reference range not established. /LPF Final    Urine Culture 06/19/2024 >100,000 Staphylococcus saprophyticus (A)   Final       ECG 12 Lead  Sinus rhythm, rate 76  Heart       Review of Systems   Constitutional:  Negative for activity change, chills, fatigue and fever.   HENT:  Negative for congestion, rhinorrhea, sinus pressure, sinus pain and sore throat.    Eyes: Negative.    Respiratory:  Negative for cough, chest tightness, shortness of breath and wheezing.    Cardiovascular:  Negative for chest pain, palpitations and leg swelling.   Gastrointestinal:  Negative for abdominal distention, abdominal pain, constipation, diarrhea, nausea and vomiting.        Lower abdominal bloating   Endocrine: Negative.    Genitourinary: Negative.    Musculoskeletal: Negative.    Skin: Negative.    Allergic/Immunologic: Negative.    Neurological:  Negative for dizziness, syncope, weakness, light-headedness, numbness and headaches.   Hematological: Negative.    Psychiatric/Behavioral: Negative.     All other systems reviewed and are negative.      Objective   Visit Vitals  /72   Pulse 80   Temp 36.2 °C (97.2 °F)   Wt 64 kg (141 lb)   SpO2 98%   BMI 27.54 kg/m²   OB Status Recent pregnancy   Smoking Status Former   BSA 1.65 m²      Physical Exam  Vitals  and nursing note reviewed.   Constitutional:       General: She is not in acute distress.     Appearance: Normal appearance. She is not ill-appearing.   HENT:      Head: Normocephalic and atraumatic.      Right Ear: Tympanic membrane, ear canal and external ear normal.      Left Ear: Tympanic membrane, ear canal and external ear normal.      Nose: Nose normal.      Mouth/Throat:      Mouth: Mucous membranes are moist.      Pharynx: Oropharynx is clear.   Eyes:      Pupils: Pupils are equal, round, and reactive to light.   Cardiovascular:      Rate and Rhythm: Normal rate and regular rhythm.      Pulses: Normal pulses.      Heart sounds: Normal heart sounds. No murmur heard.  Pulmonary:      Effort: Pulmonary effort is normal. No respiratory distress.      Breath sounds: Normal breath sounds. No wheezing.   Abdominal:      General: Bowel sounds are normal. There is no distension.      Palpations: Abdomen is soft.      Tenderness: There is no abdominal tenderness.   Musculoskeletal:         General: No tenderness. Normal range of motion.      Cervical back: Normal range of motion and neck supple.      Right lower leg: No edema.      Left lower leg: No edema.   Skin:     General: Skin is warm and dry.      Capillary Refill: Capillary refill takes less than 2 seconds.      Coloration: Skin is not jaundiced.   Neurological:      General: No focal deficit present.      Mental Status: She is alert and oriented to person, place, and time.      Motor: No weakness.   Psychiatric:         Mood and Affect: Mood normal.         Behavior: Behavior normal.         Thought Content: Thought content normal.         Judgment: Judgment normal.         Assessment/Plan   Luma was seen today for heart murmur, mole and uti.  Diagnoses and all orders for this visit:  Urinary symptom or sign (Primary)  -     Cancel: Urinalysis with Reflex Culture and Microscopic; Future  -     Urinalysis with Reflex Culture and Microscopic; Future  Heart  murmur  -     ECG 12 Lead  Healthcare maintenance  -     HCG, quantitative, pregnancy; Future  -     Hemoglobin A1C; Future    Follow-up as needed with me until 10/11/2024 and follow up after with new provider  Patient advised I will be leaving [facility], my last day is October 11, 2024. Discussed options for new provider.

## 2025-04-22 ENCOUNTER — APPOINTMENT (OUTPATIENT)
Dept: OTOLARYNGOLOGY | Facility: CLINIC | Age: 35
End: 2025-04-22
Payer: COMMERCIAL